# Patient Record
Sex: FEMALE | Race: WHITE | Employment: OTHER | URBAN - METROPOLITAN AREA
[De-identification: names, ages, dates, MRNs, and addresses within clinical notes are randomized per-mention and may not be internally consistent; named-entity substitution may affect disease eponyms.]

---

## 2017-02-24 ENCOUNTER — OFFICE VISIT (OUTPATIENT)
Dept: FAMILY MEDICINE CLINIC | Facility: CLINIC | Age: 67
End: 2017-02-24

## 2017-02-24 ENCOUNTER — LAB ENCOUNTER (OUTPATIENT)
Dept: LAB | Age: 67
End: 2017-02-24
Attending: INTERNAL MEDICINE
Payer: MEDICARE

## 2017-02-24 VITALS
RESPIRATION RATE: 16 BRPM | DIASTOLIC BLOOD PRESSURE: 80 MMHG | SYSTOLIC BLOOD PRESSURE: 124 MMHG | WEIGHT: 228 LBS | TEMPERATURE: 98 F | HEIGHT: 65 IN | BODY MASS INDEX: 37.99 KG/M2 | HEART RATE: 72 BPM

## 2017-02-24 DIAGNOSIS — E03.1 CONGENITAL HYPOTHYROIDISM WITHOUT GOITER: Primary | ICD-10-CM

## 2017-02-24 DIAGNOSIS — R25.1 TREMOR: ICD-10-CM

## 2017-02-24 DIAGNOSIS — E03.1 CONGENITAL HYPOTHYROIDISM WITHOUT GOITER: ICD-10-CM

## 2017-02-24 DIAGNOSIS — R25.1 INVOLUNTARY QUIVER: Primary | ICD-10-CM

## 2017-02-24 LAB
ALBUMIN SERPL-MCNC: 3.7 G/DL (ref 3.5–4.8)
ALP LIVER SERPL-CCNC: 72 U/L (ref 55–142)
ALT SERPL-CCNC: 17 U/L (ref 14–54)
AST SERPL-CCNC: 14 U/L (ref 15–41)
BASOPHILS # BLD AUTO: 0.08 X10(3) UL (ref 0–0.1)
BASOPHILS NFR BLD AUTO: 1.2 %
BILIRUB SERPL-MCNC: 0.4 MG/DL (ref 0.1–2)
BUN BLD-MCNC: 12 MG/DL (ref 8–20)
CALCIUM BLD-MCNC: 8.5 MG/DL (ref 8.3–10.3)
CHLORIDE: 107 MMOL/L (ref 101–111)
CO2: 27 MMOL/L (ref 22–32)
CREAT BLD-MCNC: 0.88 MG/DL (ref 0.55–1.02)
EOSINOPHIL # BLD AUTO: 0.07 X10(3) UL (ref 0–0.3)
EOSINOPHIL NFR BLD AUTO: 1 %
ERYTHROCYTE [DISTWIDTH] IN BLOOD BY AUTOMATED COUNT: 14.4 % (ref 11.5–16)
FREE T4: 1 NG/DL (ref 0.9–1.8)
GLUCOSE BLD-MCNC: 94 MG/DL (ref 70–99)
HCT VFR BLD AUTO: 43.7 % (ref 34–50)
HGB BLD-MCNC: 14.2 G/DL (ref 12–16)
IMMATURE GRANULOCYTE COUNT: 0.02 X10(3) UL (ref 0–1)
IMMATURE GRANULOCYTE RATIO %: 0.3 %
LYMPHOCYTES # BLD AUTO: 1.71 X10(3) UL (ref 0.9–4)
LYMPHOCYTES NFR BLD AUTO: 24.7 %
M PROTEIN MFR SERPL ELPH: 7.6 G/DL (ref 6.1–8.3)
MCH RBC QN AUTO: 28.9 PG (ref 27–33.2)
MCHC RBC AUTO-ENTMCNC: 32.5 G/DL (ref 31–37)
MCV RBC AUTO: 88.8 FL (ref 81–100)
MONOCYTES # BLD AUTO: 0.51 X10(3) UL (ref 0.1–0.6)
MONOCYTES NFR BLD AUTO: 7.4 %
NEUTROPHIL ABS PRELIM: 4.52 X10 (3) UL (ref 1.3–6.7)
NEUTROPHILS # BLD AUTO: 4.52 X10(3) UL (ref 1.3–6.7)
NEUTROPHILS NFR BLD AUTO: 65.4 %
PLATELET # BLD AUTO: 287 10(3)UL (ref 150–450)
POTASSIUM SERPL-SCNC: 4.4 MMOL/L (ref 3.6–5.1)
RBC # BLD AUTO: 4.92 X10(6)UL (ref 3.8–5.1)
RED CELL DISTRIBUTION WIDTH-SD: 46.1 FL (ref 35.1–46.3)
SODIUM SERPL-SCNC: 139 MMOL/L (ref 136–144)
TSI SER-ACNC: 4.22 MIU/ML (ref 0.35–5.5)
WBC # BLD AUTO: 6.9 X10(3) UL (ref 4–13)

## 2017-02-24 PROCEDURE — 84443 ASSAY THYROID STIM HORMONE: CPT

## 2017-02-24 PROCEDURE — 80053 COMPREHEN METABOLIC PANEL: CPT

## 2017-02-24 PROCEDURE — 99213 OFFICE O/P EST LOW 20 MIN: CPT | Performed by: PHYSICIAN ASSISTANT

## 2017-02-24 PROCEDURE — 85025 COMPLETE CBC W/AUTO DIFF WBC: CPT

## 2017-02-24 PROCEDURE — 84439 ASSAY OF FREE THYROXINE: CPT

## 2017-02-24 PROCEDURE — 36415 COLL VENOUS BLD VENIPUNCTURE: CPT

## 2017-02-24 NOTE — PROGRESS NOTES
[facility] Internal Medicine Progress Note    CC:  Patient presents with:  Tremors: worse when writing - wants thyroid labs checked      HPI:   HPI  Tremor  It started a few years ago, and it was rare, she thought it was stress related  She state route. Disp:  Rfl:    MELATONIN CR  MG X1 PM Disp:  Rfl:    OLIVE LEAF OR NASAL SPRAY X2 DAILY Disp:  Rfl:      No current facility-administered medications on file prior to visit.     Review of Systems :  Review of Systems   Constitutional: Negative Differential W Platelet [E]  Comp Metabolic Panel (14) [E]    Meds & Refills for this Visit:  No prescriptions requested or ordered in this encounter    Imaging & Consults:  NEURO - INTERNAL     Patient/Caregiver Education: Patient/Caregiver Education:  The

## 2017-02-24 NOTE — PATIENT INSTRUCTIONS
Thank you for choosing Shakeel Pena PA-C at [place] [address]  To Do: Alesia Juan  1. Pt to get lab work done  2. Referral for neuro, Dr. Jayashree Marvin  3.  Follow-up in 1 month, sooner if problem    • Please signup for MY CHART, which is electronic healthier and to improve your quality of life.     Referrals, and Radiology Information:    If your insurance requires a referral to a specialist, please allow 5 business days to process your referral request.    If Horald Mcardle, PA-C orders a CT or MRI

## 2017-02-27 NOTE — PROGRESS NOTES
Quick Note:    TSH is within range, but more elevated than normal  Will increase levothyroxine to 100mcg daily and recheck lab work in 12 weeks  Pt should still follow-up with neuro as directed, referral was given at [address] [place] [address] Rd  ______

## 2017-02-28 DIAGNOSIS — E03.1 CONGENITAL HYPOTHYROIDISM WITHOUT GOITER: ICD-10-CM

## 2017-02-28 DIAGNOSIS — R79.89 ABNORMAL TSH: Primary | ICD-10-CM

## 2017-02-28 RX ORDER — LEVOTHYROXINE SODIUM 0.1 MG/1
100 TABLET ORAL DAILY
Qty: 90 TABLET | Refills: 1 | Status: SHIPPED | OUTPATIENT
Start: 2017-02-28 | End: 2017-09-06 | Stop reason: DRUGHIGH

## 2017-03-08 ENCOUNTER — OFFICE VISIT (OUTPATIENT)
Dept: NEUROLOGY | Facility: CLINIC | Age: 67
End: 2017-03-08

## 2017-03-08 VITALS
HEART RATE: 68 BPM | SYSTOLIC BLOOD PRESSURE: 100 MMHG | RESPIRATION RATE: 12 BRPM | WEIGHT: 226 LBS | BODY MASS INDEX: 38 KG/M2 | DIASTOLIC BLOOD PRESSURE: 64 MMHG

## 2017-03-08 DIAGNOSIS — R25.1 TREMOR: Primary | ICD-10-CM

## 2017-03-08 PROCEDURE — 99204 OFFICE O/P NEW MOD 45 MIN: CPT | Performed by: OTHER

## 2017-03-08 RX ORDER — CHOLECALCIFEROL (VITAMIN D3) 1MM UNIT/G
4000 LIQUID (GRAM) MISCELLANEOUS DAILY PRN
COMMUNITY

## 2017-03-08 RX ORDER — CHOLECALCIFEROL (VITAMIN D3) 125 MCG
10 CAPSULE ORAL NIGHTLY
COMMUNITY

## 2017-03-08 NOTE — PATIENT INSTRUCTIONS
After your visit at the [facility] office  today,  please direct any follow up questions or medication needs to the staff in our  [place] office so that your concerns may be promptly addressed.   We are available through [employer] or at the numbers below: not schedule the test until this office has notified you that the test has been approved by your insurer. Depending on your insurance carrier, approval may take 3-10 days.  It is highly recommended patients contact their insurance carrier directly to deter

## 2017-03-08 NOTE — H&P
[facility] New Patient / Consult Visit    Roge Zavala is a 78year old female.                          Referring MD: James Soliz    Patient presents with:  Tremors: Rm. 7: R hand tremor when she tries to write or uses the co change, fevers, chills, nausea, double vision/ blurry vision / loss of vision, chest pain, palpitations, shortness of breath, rashes, or bowel / bladder incontinence or mood issues.      Past Medical History   Diagnosis Date   • Sinus disorder    • Lipid sc Diclofenac Sodium 75 MG Oral Tab EC Take 1 tablet (75 mg total) by mouth 2 (two) times daily.  (Patient taking differently: Take 75 mg by mouth nightly.  ) Disp: 60 tablet Rfl: 3   Acetaminophen-Codeine #3 300-30 MG Oral Tab Take 1 tablet by mouth every 6 sharp bilaterally    Cranial Nerves: II through XII  Optic:    Pupils: equally round and reactive to light with direct and consensual responses, normal accomodation   Visual acuity: Normal              Visual fields: Normal  Oculomotor/Trochlear/Abducens: suggest a parkinsonian disorder - exam also demonstrates mild decreased sensation distally.         Overall, suspect this is either essential type tremor or a task specific tremor such as primary writing tremor - recommended trial of  pharmacotherapy - disc

## 2017-03-25 ENCOUNTER — PATIENT MESSAGE (OUTPATIENT)
Dept: FAMILY MEDICINE CLINIC | Facility: CLINIC | Age: 67
End: 2017-03-25

## 2017-03-25 DIAGNOSIS — R79.89 ELEVATED TSH: Primary | ICD-10-CM

## 2017-03-27 RX ORDER — LEVOTHYROXINE SODIUM 88 UG/1
TABLET ORAL
Qty: 90 TABLET | Refills: 1 | Status: SHIPPED | OUTPATIENT
Start: 2017-03-27 | End: 2017-11-02

## 2017-03-27 NOTE — TELEPHONE ENCOUNTER
Patient states she has stopped her levothyroxine due to tremors increasing tremendously after increasing her levothyroxine. States she stopped taking it Sunday 03/26/17.  She is also informing that she is having dental work done and is on a course of Antibi

## 2017-03-27 NOTE — TELEPHONE ENCOUNTER
From: Sumi Mclaughlin  To: Olga Mahmood MD  Sent: 3/25/2017 4:57 PM CDT  Subject: Prescription Question    I have been diagnosed with \"essential tremor\" By Dr Abena Vega. I have a follow up appointment in June.  Meanwhile, upon your recommendation, my

## 2017-03-27 NOTE — TELEPHONE ENCOUNTER
Patient informed of MD recommendations below, via [employer] message.    Order placed for labs   New levothyroxine RX with new dose sent to pharmacy

## 2017-03-27 NOTE — TELEPHONE ENCOUNTER
Lets drop her synthroid dose down to 88 mcg again   And recheck tsh and t4 in 8 weeks   I do think her issues are related to her dental procedure.

## 2017-06-12 ENCOUNTER — TELEPHONE (OUTPATIENT)
Dept: FAMILY MEDICINE CLINIC | Facility: CLINIC | Age: 67
End: 2017-06-12

## 2017-06-12 NOTE — TELEPHONE ENCOUNTER
Requesting labs to check thyroid levels (TSH and T4)  LOV: 2/24/17  RTC: 1 mos  Notes Recorded by Vito Carreon PA-C on 2/26/2017 at 9:04 PM  TSH is within range, but more elevated than normal  Will increase levothyroxine to 100mcg daily and recheck

## 2017-06-23 ENCOUNTER — PATIENT MESSAGE (OUTPATIENT)
Dept: FAMILY MEDICINE CLINIC | Facility: CLINIC | Age: 67
End: 2017-06-23

## 2017-06-23 NOTE — TELEPHONE ENCOUNTER
From: Alesia Juan  To: Calleen Denver, MD  Sent: 6/23/2017 2:26 PM CDT  Subject: Non-Urgent Medical Question    So you are aware, I did not know that a thyroid order had been placed until the week of June 12th. I will take care of this next week.  Your let

## 2017-06-26 ENCOUNTER — TELEPHONE (OUTPATIENT)
Dept: FAMILY MEDICINE CLINIC | Facility: CLINIC | Age: 67
End: 2017-06-26

## 2017-06-26 DIAGNOSIS — E05.90 HYPERTHYROIDISM: Primary | ICD-10-CM

## 2017-06-26 NOTE — TELEPHONE ENCOUNTER
[employer] cannot see lab orders that were entered in February for repeat TSH and Free T4. Will re enter orders since patient is there now for lab draw.

## 2017-06-29 NOTE — TELEPHONE ENCOUNTER
Requesting Alprazolam   LOV: 2/24/17  RTC: 1m   Last Labs: n/a  Filled: 9/1/16 #60 with 0 refills    Future Appointments  Date Time Provider Nicholas Wilks   9/6/2017 11:30 AM Gudelia Basurto MD [facility] [address] [facility] [address]       Per [place]  patient last filled 9/1/1

## 2017-07-05 RX ORDER — ALPRAZOLAM 0.25 MG/1
TABLET ORAL
Qty: 60 TABLET | Refills: 0 | Status: SHIPPED | OUTPATIENT
Start: 2017-07-05 | End: 2018-02-20

## 2017-07-05 NOTE — TELEPHONE ENCOUNTER
Alprazolam approved by MD and faxed to [facility] and confirmed.

## 2017-09-06 ENCOUNTER — OFFICE VISIT (OUTPATIENT)
Dept: FAMILY MEDICINE CLINIC | Facility: CLINIC | Age: 67
End: 2017-09-06

## 2017-09-06 VITALS
SYSTOLIC BLOOD PRESSURE: 116 MMHG | BODY MASS INDEX: 36.82 KG/M2 | HEIGHT: 65 IN | DIASTOLIC BLOOD PRESSURE: 80 MMHG | TEMPERATURE: 98 F | HEART RATE: 82 BPM | WEIGHT: 221 LBS | RESPIRATION RATE: 16 BRPM

## 2017-09-06 DIAGNOSIS — E03.1 CONGENITAL HYPOTHYROIDISM WITHOUT GOITER: ICD-10-CM

## 2017-09-06 DIAGNOSIS — Z00.00 MEDICARE ANNUAL WELLNESS VISIT, SUBSEQUENT: Primary | ICD-10-CM

## 2017-09-06 DIAGNOSIS — E78.00 PURE HYPERCHOLESTEROLEMIA: ICD-10-CM

## 2017-09-06 DIAGNOSIS — E55.9 VITAMIN D DEFICIENCY: ICD-10-CM

## 2017-09-06 DIAGNOSIS — L81.9 HYPERPIGMENTATION: ICD-10-CM

## 2017-09-06 DIAGNOSIS — M79.622 LEFT AXILLARY PAIN: ICD-10-CM

## 2017-09-06 DIAGNOSIS — Z12.39 SCREENING FOR BREAST CANCER: ICD-10-CM

## 2017-09-06 DIAGNOSIS — M25.561 CHRONIC PAIN OF RIGHT KNEE: ICD-10-CM

## 2017-09-06 DIAGNOSIS — Z23 NEED FOR VACCINATION: ICD-10-CM

## 2017-09-06 DIAGNOSIS — G89.29 CHRONIC PAIN OF RIGHT KNEE: ICD-10-CM

## 2017-09-06 PROCEDURE — G0008 ADMIN INFLUENZA VIRUS VAC: HCPCS | Performed by: INTERNAL MEDICINE

## 2017-09-06 PROCEDURE — 99213 OFFICE O/P EST LOW 20 MIN: CPT | Performed by: INTERNAL MEDICINE

## 2017-09-06 PROCEDURE — 90686 IIV4 VACC NO PRSV 0.5 ML IM: CPT | Performed by: INTERNAL MEDICINE

## 2017-09-06 PROCEDURE — G0439 PPPS, SUBSEQ VISIT: HCPCS | Performed by: INTERNAL MEDICINE

## 2017-09-06 NOTE — PROGRESS NOTES
Abdifatah Bell is a 75year old female who presents for a Medicare Annual Wellness visit.     Getting used to shelter   Feels she lost some purpose     Had shooting pain in her left heel after weraing a pair of flats   Thinks she had a plantar fascia f mouth daily as needed. Disp:  Rfl:    Melatonin 5 MG Oral Tab Take 10 mg by mouth nightly. Disp:  Rfl:    NON FORMULARY genexa For leg cramps Disp:  Rfl:    Diclofenac Sodium 75 MG Oral Tab EC Take 1 tablet (75 mg total) by mouth 2 (two) times daily.  (Stephanie Results  Component Value Date   ALT 17 02/24/2017   ALT 16 08/16/2016   ALT 15 01/25/2016       Lab Results  Component Value Date   TSH 3.89 06/26/2017   TSH 4.220 02/24/2017   TSH 2.600 08/16/2016       Lab Results  Component Value Date   BUN 12 02/24/201 on multiple medications?: 1-Yes    Does pain affect your day to day activities?: 1-Yes     Have you had any memory issues?: 0-No    Fall/Risk Scorin    Scoring Interpretation: 4+ At Risk     Depression Screening (PHQ-2/PHQ-9): Over the LAST 2 WEEKS   L flowsheet data found. Glaucoma Screening      Ophthalmology Visit Annually      Bone Density Screening      Dexascan Every two years Last Dexa Scan:   XR DEXA BONE DENSITOMETRY (CPT=77080) 12/11/2015   declined   No flowsheet data found.     Pap and Pelv HgbA1C  Annually HEMOGLOBIN A1c (% of total Hgb)   Date Value   09/08/2014 5.6    No flowsheet data found.     Creat/alb ratio  Annually      LDL  Annually LDL-CHOLESTEROL (mg/dL (calc))   Date Value   08/01/2014 176 (H)     LDL Cholesterol (mg/dL)   Date V Lactobacillus (ACIDOPHILUS PROBIOTIC OR) Take 1 tablet by mouth daily as needed. Disp:  Rfl:    MAGNESIUM ACETATE Take 250 mg by mouth daily as needed.    Disp:  Rfl:    OLIVE LEAF OR NASAL SPRAY EVERY OTHER DAY AS NEEDED Disp:  Rfl:       MEDICAL INFOR denies back pain  NEURO: denies headaches  PSYCHE: denies depression or anxiety  HEMATOLOGIC: denies hx of anemia  ENDOCRINE: denies thyroid history  ALL/ASTHMA: denies hx of allergy or asthma    EXAM:   /80 (BP Location: Right arm, Patient Position: without goiter  -recheck   -continue synthroid   -     ASSAY, THYROID STIM HORMONE  -     FREE T4 (FREE THYROXINE)    Screening for breast cancer  -     [facility] SCREENING BILAT (CPT=77067);  Future    Left axillary pain  -re-evaluate the lymph node  -     US AX

## 2017-09-09 LAB
ABSOLUTE BASOPHILS: 79 CELLS/UL (ref 0–200)
ABSOLUTE EOSINOPHILS: 128 CELLS/UL (ref 15–500)
ABSOLUTE LYMPHOCYTES: 1751 CELLS/UL (ref 850–3900)
ABSOLUTE MONOCYTES: 555 CELLS/UL (ref 200–950)
ABSOLUTE NEUTROPHILS: 3587 CELLS/UL (ref 1500–7800)
ALBUMIN/GLOBULIN RATIO: 1.3 (CALC) (ref 1–2.5)
ALBUMIN: 4.1 G/DL (ref 3.6–5.1)
ALKALINE PHOSPHATASE: 74 U/L (ref 33–130)
ALT: 17 U/L (ref 6–29)
AST: 15 U/L (ref 10–35)
BASOPHILS: 1.3 %
BILIRUBIN, TOTAL: 0.6 MG/DL (ref 0.2–1.2)
BUN: 15 MG/DL (ref 7–25)
CALCIUM: 9.1 MG/DL (ref 8.6–10.4)
CARBON DIOXIDE: 24 MMOL/L (ref 20–31)
CHLORIDE: 104 MMOL/L (ref 98–110)
CHOL/HDLC RATIO: 4.2 (CALC)
CHOLESTEROL, TOTAL: 215 MG/DL
CREATININE: 0.77 MG/DL (ref 0.5–0.99)
EGFR IF AFRICN AM: 92 ML/MIN/1.73M2
EGFR IF NONAFRICN AM: 79 ML/MIN/1.73M2
EOSINOPHILS: 2.1 %
GLOBULIN: 3.1 G/DL (CALC) (ref 1.9–3.7)
GLUCOSE: 91 MG/DL (ref 65–99)
HDL CHOLESTEROL: 51 MG/DL
HEMATOCRIT: 42.5 % (ref 35–45)
HEMOGLOBIN: 14.5 G/DL (ref 11.7–15.5)
LDL-CHOLESTEROL: 148 MG/DL (CALC)
LYMPHOCYTES: 28.7 %
MCH: 29.5 PG (ref 27–33)
MCHC: 34.1 G/DL (ref 32–36)
MCV: 86.4 FL (ref 80–100)
MONOCYTES: 9.1 %
MPV: 10.9 FL (ref 7.5–12.5)
NEUTROPHILS: 58.8 %
NON-HDL CHOLESTEROL: 164 MG/DL (CALC)
PLATELET COUNT: 252 THOUSAND/UL (ref 140–400)
POTASSIUM: 4.6 MMOL/L (ref 3.5–5.3)
PROTEIN, TOTAL: 7.2 G/DL (ref 6.1–8.1)
RDW: 13.8 % (ref 11–15)
RED BLOOD CELL COUNT: 4.92 MILLION/UL (ref 3.8–5.1)
SODIUM: 137 MMOL/L (ref 135–146)
T4, FREE: 1.2 NG/DL (ref 0.8–1.8)
TRIGLYCERIDES: 69 MG/DL
TSH: 3.75 MIU/L (ref 0.4–4.5)
VITAMIN D, 25-OH, TOTAL: 22 NG/ML (ref 30–100)
WHITE BLOOD CELL COUNT: 6.1 THOUSAND/UL (ref 3.8–10.8)

## 2017-09-11 RX ORDER — ERGOCALCIFEROL 1.25 MG/1
50000 CAPSULE ORAL WEEKLY
Qty: 12 CAPSULE | Refills: 0 | Status: SHIPPED | OUTPATIENT
Start: 2017-09-11 | End: 2017-11-28

## 2017-11-02 RX ORDER — LEVOTHYROXINE SODIUM 88 UG/1
TABLET ORAL
Qty: 90 TABLET | Refills: 1 | Status: SHIPPED
Start: 2017-11-02 | End: 2018-10-06

## 2017-11-02 NOTE — TELEPHONE ENCOUNTER
Patient phoned and said that specialist is very far from her and wanted to know if Dr. Radha Young would refill? Diclofenac Sodium 75 mg oral tab EC 1 tab daily per patient.

## 2017-11-02 NOTE — TELEPHONE ENCOUNTER
Thyroid Supplements Protocol Passed  Requesting Levothyroxine 88mcg and diclofenac sodium  LOV: 9/6/17  RTC: none specified  Last Labs: 9/8/17  Filled: Levothyroxine 3/27/17 #90 with 1 refills-approved  Diclofenac Sodium 75 MG #60 with 3 refills- non abner

## 2017-11-02 NOTE — TELEPHONE ENCOUNTER
From: Rudy Adams  Sent: 11/2/2017 11:33 AM CDT  Subject: Medication Renewal Request    Rudy Adams would like a refill of the following medications:     Levothyroxine Sodium 88 MCG Oral Tab Pushpa Schuler MD]    Preferred pharmacy: [facility]

## 2017-11-03 RX ORDER — DICLOFENAC SODIUM 75 MG/1
75 TABLET, DELAYED RELEASE ORAL 2 TIMES DAILY
Qty: 60 TABLET | Refills: 3 | Status: SHIPPED | OUTPATIENT
Start: 2017-11-03 | End: 2017-12-28

## 2017-11-17 ENCOUNTER — APPOINTMENT (OUTPATIENT)
Dept: GENERAL RADIOLOGY | Facility: HOSPITAL | Age: 67
End: 2017-11-17

## 2017-11-17 ENCOUNTER — HOSPITAL ENCOUNTER (EMERGENCY)
Facility: HOSPITAL | Age: 67
Discharge: HOME | End: 2017-11-17
Admitting: EMERGENCY MEDICINE

## 2017-11-17 VITALS
TEMPERATURE: 97.7 F | RESPIRATION RATE: 20 BRPM | HEART RATE: 66 BPM | OXYGEN SATURATION: 98 % | BODY MASS INDEX: 36.65 KG/M2 | WEIGHT: 220 LBS | SYSTOLIC BLOOD PRESSURE: 114 MMHG | DIASTOLIC BLOOD PRESSURE: 70 MMHG | HEIGHT: 65 IN

## 2017-11-17 DIAGNOSIS — S69.91XA INJURY OF RIGHT SCAPHOLUNATE LIGAMENT WITH NO INSTABILITY, INITIAL ENCOUNTER: ICD-10-CM

## 2017-11-17 DIAGNOSIS — M79.641 PAIN OF RIGHT HAND: Primary | ICD-10-CM

## 2017-11-17 PROCEDURE — 73110 X-RAY EXAM OF WRIST: CPT

## 2017-11-17 PROCEDURE — 73130 X-RAY EXAM OF HAND: CPT

## 2017-11-17 PROCEDURE — 99283 EMERGENCY DEPT VISIT LOW MDM: CPT

## 2017-11-17 RX ORDER — DICLOFENAC SODIUM AND MISOPROSTOL 50; 200 MG/1; UG/1
1 TABLET, DELAYED RELEASE ORAL 2 TIMES DAILY
COMMUNITY

## 2017-11-17 RX ORDER — LEVOTHYROXINE SODIUM 88 UG/1
88 TABLET ORAL DAILY
COMMUNITY

## 2017-11-17 RX ORDER — HYDROCODONE BITARTRATE AND ACETAMINOPHEN 5; 325 MG/1; MG/1
1 TABLET ORAL EVERY 6 HOURS PRN
Qty: 8 TABLET | Refills: 0 | Status: SHIPPED | OUTPATIENT
Start: 2017-11-17 | End: 2017-11-19

## 2017-11-17 NOTE — ED PROVIDER NOTES
Subjective   HPI Comments: Patient is a 67-year-old  female COMPLAINING OF rt hand and wrist PAIN.  PATIENT REPORTS THAT SHE was traveling through [place] when she decided to stop at the [place]. Pt STATES SHE WAS WALKING out when she tripped ON HER SHOES.  PATIENT STATES SHE FELL ON HER OUTSTRETCHED RIGHT HAND. She is right hand dominant.  PATIENT REPORTS THAT SHE DECIDED COME TO THE ER FOR FURTHER EVALUATION.  She denies hitting her head she denies any other injuries.    Patient is a 67 y.o. female presenting with upper extremity pain.   History provided by:  Patient   used: No    Upper Extremity Issue   Location:  Hand and wrist  Wrist location:  R wrist  Hand location:  R hand  Injury: yes    Mechanism of injury: fall    Fall:     Fall occurred:  Standing    Impact surface:  [place]    Point of impact:  Outstretched arms    Entrapped after fall: no    Pain details:     Quality:  Aching and dull    Radiates to:  Does not radiate    Severity:  Mild    Onset quality:  Sudden    Duration:  1 hour    Timing:  Constant    Progression:  Unchanged  Handedness:  Right-handed  Dislocation: no    Foreign body present:  No foreign bodies  Prior injury to area:  No  Relieved by:  Nothing  Worsened by:  Nothing  Ineffective treatments:  None tried  Associated symptoms: no back pain, no decreased range of motion, no fatigue, no fever, no muscle weakness, no neck pain, no numbness, no stiffness, no swelling and no tingling    Risk factors: no concern for non-accidental trauma, no known bone disorder, no frequent fractures and no recent illness        Review of Systems   Constitutional: Negative for fatigue and fever.   Musculoskeletal: Negative for back pain, neck pain and stiffness.   All other systems reviewed and are negative.      Past Medical History:   Diagnosis Date   • Cancer     breast   • Disease of thyroid gland        No Known Allergies    Past Surgical History:   Procedure  Laterality Date   • KNEE SURGERY Right        History reviewed. No pertinent family history.    Social History     Social History   • Marital status: Single     Spouse name: N/A   • Number of children: N/A   • Years of education: N/A     Social History Main Topics   • Smoking status: Never Smoker   • Smokeless tobacco: Never Used   • Alcohol use No   • Drug use: No   • Sexual activity: Defer     Other Topics Concern   • None     Social History Narrative   • None           Objective   Physical Exam   Constitutional: She is oriented to person, place, and time. She appears well-developed and well-nourished.   HENT:   Head: Normocephalic and atraumatic.   Cardiovascular: Normal rate.    Musculoskeletal: Normal range of motion.        Arms:  Neurological: She is alert and oriented to person, place, and time.   Skin: Skin is warm and dry.   Psychiatric: She has a normal mood and affect.   Nursing note and vitals reviewed.      Procedures         ED Course  ED Course   Comment By Time   X-ray of the right hand shows no acute findings.  X-ray of the wrist showed no acute fracture.  There is widening of the scapholunate interval which does raise suspicion for scapholunate dissociation or a ligament injury. Susie Chen, APRN 11/17 9051   Patient will be discharged home in stable condition. I will place her in a FA volar orthoglass splint. I did offer the pt some pain medication but she declines at this time and states that she will just use some Tylenol. She is advised to follow-up with her orthopedic physician when she arrives in [place].  Patient is advised that she may r/t to the ER if any new or worsening symptoms. The pt will be DC home at this time in stable cond. Susie Chen, APRN 11/17 2212   Pt case discussed with Dr. Bermudez who agrees with plan of care. Susie Chen, APRN 11/17 1440        XR Wrist 3+ View Right   Final Result   1. No acute fracture is identified.   2. There is widening of the  scapholunate interval (normal less than 2 mm   in adults) which raises suspicion for scapholunate dissociation/   scapholunate ligament injury.           This report was finalized on 11/17/2017 13:49 by Dr Zach Cutler, .      XR Hand 3+ View Right   Final Result   1. No acute osseous injury or malalignment.           This report was finalized on 11/17/2017 13:47 by Dr Zach Cutler, .                  MDM  Number of Diagnoses or Management Options  Pain of right hand: new and requires workup  Sprain of right wrist, initial encounter: new and requires workup     Amount and/or Complexity of Data Reviewed  Tests in the radiology section of CPT®: ordered and reviewed    Patient Progress  Patient progress: stable      Final diagnoses:   Pain of right hand   Injury of right scapholunate ligament with no instability, initial encounter            Susie Chen, APRN  11/17/17 1439       Susie Chen, APRN  11/17/17 1446       Susie Chen, APRN  11/17/17 1452

## 2017-11-17 NOTE — ED NOTES
C/o's 'right hand pain, I tripped on my shoe about an hour ago, my right hand was the point of impact.'     Marjorie Barba RN  11/17/17 4176

## 2018-01-16 ENCOUNTER — HOSPITAL ENCOUNTER (OUTPATIENT)
Dept: MAMMOGRAPHY | Age: 68
Discharge: HOME | End: 2018-01-16
Attending: INTERNAL MEDICINE
Payer: MEDICARE

## 2018-01-16 DIAGNOSIS — Z12.39 SCREENING FOR BREAST CANCER: ICD-10-CM

## 2018-01-16 PROCEDURE — 77067 SCR MAMMO BI INCL CAD: CPT | Performed by: INTERNAL MEDICINE

## 2018-01-17 ENCOUNTER — HOSPITAL ENCOUNTER (OUTPATIENT)
Dept: GENERAL RADIOLOGY | Age: 68
Discharge: HOME | End: 2018-01-17
Attending: PHYSICIAN ASSISTANT
Payer: MEDICARE

## 2018-01-17 ENCOUNTER — OFFICE VISIT (OUTPATIENT)
Dept: FAMILY MEDICINE CLINIC | Facility: CLINIC | Age: 68
End: 2018-01-17

## 2018-01-17 ENCOUNTER — TELEPHONE (OUTPATIENT)
Dept: INTERNAL MEDICINE CLINIC | Facility: CLINIC | Age: 68
End: 2018-01-17

## 2018-01-17 VITALS
HEIGHT: 65 IN | SYSTOLIC BLOOD PRESSURE: 124 MMHG | TEMPERATURE: 98 F | WEIGHT: 222 LBS | OXYGEN SATURATION: 98 % | DIASTOLIC BLOOD PRESSURE: 78 MMHG | BODY MASS INDEX: 36.99 KG/M2 | RESPIRATION RATE: 16 BRPM | HEART RATE: 80 BPM

## 2018-01-17 DIAGNOSIS — R05.9 COUGH: Primary | ICD-10-CM

## 2018-01-17 DIAGNOSIS — G47.9 SLEEPING DIFFICULTY: ICD-10-CM

## 2018-01-17 DIAGNOSIS — R05.9 COUGH: ICD-10-CM

## 2018-01-17 DIAGNOSIS — E66.9 OBESITY (BMI 30-39.9): ICD-10-CM

## 2018-01-17 PROCEDURE — 99213 OFFICE O/P EST LOW 20 MIN: CPT | Performed by: PHYSICIAN ASSISTANT

## 2018-01-17 PROCEDURE — 71046 X-RAY EXAM CHEST 2 VIEWS: CPT | Performed by: PHYSICIAN ASSISTANT

## 2018-01-17 RX ORDER — DOXYCYCLINE HYCLATE 100 MG
100 TABLET ORAL 2 TIMES DAILY
Qty: 20 TABLET | Refills: 0 | Status: SHIPPED | OUTPATIENT
Start: 2018-01-17 | End: 2018-01-29 | Stop reason: ALTCHOICE

## 2018-01-17 RX ORDER — ALBUTEROL SULFATE 90 UG/1
2 AEROSOL, METERED RESPIRATORY (INHALATION) EVERY 4 HOURS PRN
Qty: 1 INHALER | Refills: 6 | Status: SHIPPED | OUTPATIENT
Start: 2018-01-17 | End: 2021-06-03

## 2018-01-17 RX ORDER — BENZONATATE 200 MG/1
200 CAPSULE ORAL 3 TIMES DAILY PRN
Qty: 30 CAPSULE | Refills: 0 | Status: SHIPPED | OUTPATIENT
Start: 2018-01-17 | End: 2018-03-20 | Stop reason: ALTCHOICE

## 2018-01-17 RX ORDER — METHYLPREDNISOLONE 4 MG/1
TABLET ORAL
Qty: 1 KIT | Refills: 0 | Status: SHIPPED | OUTPATIENT
Start: 2018-01-17 | End: 2018-01-29 | Stop reason: ALTCHOICE

## 2018-01-17 NOTE — TELEPHONE ENCOUNTER
Patient was seen in our office today & order was written for [facility]. Order was faxed to [phone] as listed on form & copy was given to patient, she was instructed to call the office to schedule her appointment.  Fax confirmation

## 2018-01-17 NOTE — PROGRESS NOTES
[facility] Internal Medicine Progress Note    CC:  Patient presents with:  Cough: chest congestion.  Taking  OTC Robitussin      HPI:   HPI  Cough  Cough since [place]  Pt knows her smoking doesn't help  She tried chantix, but she was getting ho differently: DISSOLVE ONE LOZENGE  BY MOUTH FIVE TIMES DAILY AS NEEDED ) Disp: 70 lozenge Rfl: 2   B Complex Vitamins (B COMPLEX OR) as needed. Disp:  Rfl:    POTASSIUM GLUCONATE OR Take 550 mg by mouth as needed.  Takes when has cramps in feet Disp:  Rfl Neurological: She is alert and oriented to person, place, and time. Skin: Skin is warm and dry. Psychiatric: Mood and affect normal.   Vitals reviewed.         Assessment and Plan:  Cough  (primary encounter diagnosis)  Pt to get chest xray  Start med meniscus of knee, current     Heart disease, unspecified     Tobacco use disorder     Cardiac dysrhythmia, unspecified     Myalgia and myositis, unspecified     Hypothyroidism     Osteoarthrosis, unspecified whether generalized or localized, involving lowe

## 2018-01-17 NOTE — PATIENT INSTRUCTIONS
Thank you for choosing Balbina Carpenter PA-C at [place] [address]  To Do: Werner Potter  1. Pt to begin medications as prescribed  2. Get chest xray  3. Referral for [facility]  4. Follow-up 3 months, sooner if problems  5.  Information for sleep medicine c risks of treatment even beyond those discussed today.  All therapies have potential risk of harm or side effects or medication interactions.  It is your duty and for your safety to discuss with the pharmacist and our office with questions, and to notify us

## 2018-01-29 PROCEDURE — 86235 NUCLEAR ANTIGEN ANTIBODY: CPT | Performed by: INTERNAL MEDICINE

## 2018-02-20 RX ORDER — ALPRAZOLAM 0.25 MG/1
TABLET ORAL
Qty: 60 TABLET | Refills: 0 | Status: SHIPPED
Start: 2018-02-20 | End: 2018-11-12

## 2018-02-20 NOTE — TELEPHONE ENCOUNTER
Requesting Alprazolam   LOV: 1/17/18  RTC: 3 months   Last Relevant Labs: n/a   Filled: 7/5/17 #60 with 0 refills    Future Appointments  Date Time Provider Nicholas Wilks   3/9/2018 3:00 PM CAROLYNN NETTLES [facility]   3/20/2018 2:20 PM Manan Mckeon

## 2018-03-20 PROBLEM — M15.9 PRIMARY OSTEOARTHRITIS INVOLVING MULTIPLE JOINTS: Status: ACTIVE | Noted: 2018-03-20

## 2018-03-30 ENCOUNTER — MED REC SCAN ONLY (OUTPATIENT)
Dept: FAMILY MEDICINE CLINIC | Facility: CLINIC | Age: 68
End: 2018-03-30

## 2018-05-08 PROCEDURE — 88305 TISSUE EXAM BY PATHOLOGIST: CPT | Performed by: INTERNAL MEDICINE

## 2018-05-25 ENCOUNTER — PATIENT OUTREACH (OUTPATIENT)
Dept: FAMILY MEDICINE CLINIC | Facility: CLINIC | Age: 68
End: 2018-05-25

## 2018-07-18 ENCOUNTER — LAB ENCOUNTER (OUTPATIENT)
Dept: LAB | Age: 68
End: 2018-07-18
Attending: PHYSICIAN ASSISTANT
Payer: MEDICARE

## 2018-07-18 ENCOUNTER — OFFICE VISIT (OUTPATIENT)
Dept: FAMILY MEDICINE CLINIC | Facility: CLINIC | Age: 68
End: 2018-07-18
Payer: MEDICARE

## 2018-07-18 VITALS
WEIGHT: 224 LBS | BODY MASS INDEX: 38.24 KG/M2 | DIASTOLIC BLOOD PRESSURE: 80 MMHG | SYSTOLIC BLOOD PRESSURE: 124 MMHG | HEART RATE: 86 BPM | RESPIRATION RATE: 16 BRPM | HEIGHT: 64 IN | TEMPERATURE: 98 F

## 2018-07-18 DIAGNOSIS — Z01.818 PREOP EXAMINATION: Primary | ICD-10-CM

## 2018-07-18 DIAGNOSIS — G47.30 SLEEP APNEA, UNSPECIFIED TYPE: ICD-10-CM

## 2018-07-18 DIAGNOSIS — E03.1 CONGENITAL HYPOTHYROIDISM WITHOUT GOITER: ICD-10-CM

## 2018-07-18 DIAGNOSIS — I82.4Y9 DEEP VEIN THROMBOSIS (DVT) OF PROXIMAL LOWER EXTREMITY, UNSPECIFIED CHRONICITY, UNSPECIFIED LATERALITY (HCC): ICD-10-CM

## 2018-07-18 DIAGNOSIS — Z85.3 HISTORY OF BREAST CANCER: ICD-10-CM

## 2018-07-18 LAB
ALBUMIN SERPL-MCNC: 3.4 G/DL (ref 3.5–4.8)
ALP LIVER SERPL-CCNC: 67 U/L (ref 55–142)
ALT SERPL-CCNC: 15 U/L (ref 14–54)
AST SERPL-CCNC: 15 U/L (ref 15–41)
BASOPHILS # BLD AUTO: 0.07 X10(3) UL (ref 0–0.1)
BASOPHILS NFR BLD AUTO: 1.2 %
BILIRUB SERPL-MCNC: 0.4 MG/DL (ref 0.1–2)
BUN BLD-MCNC: 11 MG/DL (ref 8–20)
CALCIUM BLD-MCNC: 8.7 MG/DL (ref 8.3–10.3)
CHLORIDE: 110 MMOL/L (ref 101–111)
CO2: 25 MMOL/L (ref 22–32)
CREAT BLD-MCNC: 0.86 MG/DL (ref 0.55–1.02)
EOSINOPHIL # BLD AUTO: 0.14 X10(3) UL (ref 0–0.3)
EOSINOPHIL NFR BLD AUTO: 2.4 %
ERYTHROCYTE [DISTWIDTH] IN BLOOD BY AUTOMATED COUNT: 14.6 % (ref 11.5–16)
GLUCOSE BLD-MCNC: 127 MG/DL (ref 70–99)
HCT VFR BLD AUTO: 42.6 % (ref 34–50)
HGB BLD-MCNC: 13.7 G/DL (ref 12–16)
IMMATURE GRANULOCYTE COUNT: 0.01 X10(3) UL (ref 0–1)
IMMATURE GRANULOCYTE RATIO %: 0.2 %
LYMPHOCYTES # BLD AUTO: 1.72 X10(3) UL (ref 0.9–4)
LYMPHOCYTES NFR BLD AUTO: 29.2 %
M PROTEIN MFR SERPL ELPH: 7.2 G/DL (ref 6.1–8.3)
MCH RBC QN AUTO: 28.7 PG (ref 27–33.2)
MCHC RBC AUTO-ENTMCNC: 32.2 G/DL (ref 31–37)
MCV RBC AUTO: 89.1 FL (ref 81–100)
MONOCYTES # BLD AUTO: 0.48 X10(3) UL (ref 0.1–1)
MONOCYTES NFR BLD AUTO: 8.1 %
NEUTROPHIL ABS PRELIM: 3.48 X10 (3) UL (ref 1.3–6.7)
NEUTROPHILS # BLD AUTO: 3.48 X10(3) UL (ref 1.3–6.7)
NEUTROPHILS NFR BLD AUTO: 58.9 %
PLATELET # BLD AUTO: 246 10(3)UL (ref 150–450)
POTASSIUM SERPL-SCNC: 3.8 MMOL/L (ref 3.6–5.1)
RBC # BLD AUTO: 4.78 X10(6)UL (ref 3.8–5.1)
RED CELL DISTRIBUTION WIDTH-SD: 48 FL (ref 35.1–46.3)
SODIUM SERPL-SCNC: 143 MMOL/L (ref 136–144)
WBC # BLD AUTO: 5.9 X10(3) UL (ref 4–13)

## 2018-07-18 PROCEDURE — 85025 COMPLETE CBC W/AUTO DIFF WBC: CPT

## 2018-07-18 PROCEDURE — 99214 OFFICE O/P EST MOD 30 MIN: CPT | Performed by: PHYSICIAN ASSISTANT

## 2018-07-18 PROCEDURE — 80053 COMPREHEN METABOLIC PANEL: CPT

## 2018-07-18 PROCEDURE — 36415 COLL VENOUS BLD VENIPUNCTURE: CPT

## 2018-07-18 NOTE — PATIENT INSTRUCTIONS
Thank you for choosing Ti Mascorro PA-C at [place] [address]  To Do: Oly Bansal  1. Pt to get labs done  2.  Follow-up after surgery as directed  Considerations in the Preoperative period:   Surgery is a big deal.  Anesthesia and your medicin with your doctor or cardiologist    Psychotropic agents:  Serotinin reuptake inhibitors like zoloft, effexor, paxil, prozac, citalopram, remeron etc. For mood should be held 3 weeks before surgery if high risk of bleeding as these may increase risk of blee 830am-12p. call [phone] but walk-in is fine.    •To schedule Imaging or tests at [facility] Schedule [phone], Go to [facility] A ER Building (For example CT scans, X rays, Ultrasound, MRI)  •Cardiac Testing in ER building Building A second wilfrid up appointment. We cannot refill your maintenance medications at a preventative wellness visit. To best provide you care, patients receiving maintenance medications need to be seen at least twice a year.

## 2018-07-18 NOTE — PROGRESS NOTES
Preoperative History and Physical    CC:  Patient presents with:  Pre-Op Exam: L extracapsular cataracct removal on 7/26/18 with Dr. Kam Kurtz is 70year old presenting for a preoperative exam.    This patient is having surgery on елена POSSIBLE POLYPECTOMY 16077;  Surgeon: aIn Harrington MD;  Location: [facility]  08/19/2015: KNEE REPLACEMENT SURGERY Right  1/2012: LUMPECTOMY LEFT Left      Comment: Invasive Ductal CA  1/2012: NEEDLE BIOPSY LEFT Left      Comment: [address] Liquid Take 4,000 Int'l Units by mouth daily as needed. Disp:  Rfl:    Melatonin 5 MG Oral Tab Take 10 mg by mouth nightly. Disp:  Rfl:    NON FORMULARY genexa For leg cramps Disp:  Rfl:    B Complex Vitamins (B COMPLEX OR) as needed.    Disp:  Rfl:    BONNY no tenderness. Lymphadenopathy:     She has no cervical adenopathy. Neurological: She is alert and oriented to person, place, and time. Skin: Skin is warm and dry. Psychiatric: Mood and affect normal.   Vitals reviewed.         Assessment and Plan: Education: There are no barriers to learning. Medical education done. Outcome: Patient verbalizes understanding.       Orders Placed This Encounter      Comp Metabolic Panel (14) [E]      CBC W Differential W Platelet [E]  No orders of the defined types wer

## 2018-09-05 ENCOUNTER — TELEPHONE (OUTPATIENT)
Dept: FAMILY MEDICINE CLINIC | Facility: CLINIC | Age: 68
End: 2018-09-05

## 2018-09-05 DIAGNOSIS — E78.00 PURE HYPERCHOLESTEROLEMIA: Primary | ICD-10-CM

## 2018-09-05 DIAGNOSIS — E55.9 VITAMIN D DEFICIENCY: ICD-10-CM

## 2018-09-05 DIAGNOSIS — E03.1 CONGENITAL HYPOTHYROIDISM WITHOUT GOITER: ICD-10-CM

## 2018-09-05 NOTE — TELEPHONE ENCOUNTER
Patient has been notified via [employer] reminder her to have her labs done 1-2 days prior to appt.     Labs pended for review/approval.  CBC, CMP done 7/18/18  Future Appointments  Date Time Provider Nicholas Wilks   9/11/2018 11:00 AM Rufina Kumari DO

## 2018-09-08 LAB
CHOL/HDLC RATIO: 4.4 (CALC)
CHOLESTEROL, TOTAL: 216 MG/DL
HDL CHOLESTEROL: 49 MG/DL
LDL-CHOLESTEROL: 149 MG/DL (CALC)
NON-HDL CHOLESTEROL: 167 MG/DL (CALC)
TRIGLYCERIDES: 76 MG/DL
TSH W/REFLEX TO FT4: 2.79 MIU/L (ref 0.4–4.5)
VITAMIN D, 25-OH, TOTAL: 30 NG/ML (ref 30–100)

## 2018-09-11 ENCOUNTER — OFFICE VISIT (OUTPATIENT)
Dept: FAMILY MEDICINE CLINIC | Facility: CLINIC | Age: 68
End: 2018-09-11
Payer: MEDICARE

## 2018-09-11 VITALS
SYSTOLIC BLOOD PRESSURE: 126 MMHG | WEIGHT: 223.13 LBS | BODY MASS INDEX: 38.09 KG/M2 | TEMPERATURE: 98 F | DIASTOLIC BLOOD PRESSURE: 80 MMHG | HEIGHT: 64 IN | RESPIRATION RATE: 16 BRPM | HEART RATE: 74 BPM

## 2018-09-11 DIAGNOSIS — Z00.00 ENCOUNTER FOR ANNUAL HEALTH EXAMINATION: Primary | ICD-10-CM

## 2018-09-11 DIAGNOSIS — Z28.21 INFLUENZA VACCINATION DECLINED BY PATIENT: ICD-10-CM

## 2018-09-11 DIAGNOSIS — R73.01 IMPAIRED FASTING GLUCOSE: ICD-10-CM

## 2018-09-11 DIAGNOSIS — Z13.31 NEGATIVE DEPRESSION SCREENING: ICD-10-CM

## 2018-09-11 PROBLEM — R73.03 PREDIABETES: Status: ACTIVE | Noted: 2018-09-11

## 2018-09-11 LAB
CARTRIDGE LOT#: 843 NUMERIC
HEMOGLOBIN A1C: 6.1 % (ref 4.3–5.6)

## 2018-09-11 PROCEDURE — G0439 PPPS, SUBSEQ VISIT: HCPCS | Performed by: FAMILY MEDICINE

## 2018-09-11 PROCEDURE — 83036 HEMOGLOBIN GLYCOSYLATED A1C: CPT | Performed by: FAMILY MEDICINE

## 2018-09-11 RX ORDER — PREDNISOLONE ACETATE 10 MG/ML
1 SUSPENSION/ DROPS OPHTHALMIC 2 TIMES DAILY
Refills: 1 | COMMUNITY
Start: 2018-08-16 | End: 2020-08-19

## 2018-09-11 RX ORDER — KETOROLAC TROMETHAMINE 5 MG/ML
1 SOLUTION OPHTHALMIC 2 TIMES DAILY
Refills: 1 | COMMUNITY
Start: 2018-08-16 | End: 2019-04-24

## 2018-09-11 NOTE — PROGRESS NOTES
HPI:   Alesia Juan is a 70year old female who presents for a Medicare Subsequent Annual Wellness visit (Pt already had Initial Annual Wellness).     Her last annual assessment has been over 1 year: Annual Physical due on 09/06/2018       HLD current Smokeless tobacco: Never Used     This is a tobacco user, and I will give tobacco cessation counseling today.  (update Vitals or Tob Hx section to lupe Tobacco Cessation counseling given as YES and refresh this link)          CAGE Alcohol screening   Era Poag hcl]; and lactose. CURRENT MEDICATIONS:     Outpatient Medications Marked as Taking for the 9/11/18 encounter (Office Visit) with Tj Tipton DO:  ketorolac tromethamine 0.5 % Ophthalmic Solution Place 1 drop into both eyes 2 (two) times daily. 1/2012); radiation left (2012); lumpectomy left (Left, 1/2012); knee replacement surgery (Right, 08/19/2015); cataract (Bilateral); COLONOSCOPY, POSSIBLE BIOPSY, POSSIBLE POLYPECTOMY 73699 (N/A, 5/8/2018); and KNEE TOTAL REPLACEMENT (Right, 8/19/2015). normal, no drainage or sinus tenderness   Throat: Lips, mucosa, and tongue normal; teeth and gums normal   Neck: Supple, symmetrical, trachea midline, no adenopathy;  thyroid: not enlarged, symmetric, no tenderness/mass/nodules; no carotid bruit or JVD   B screening    Influenza vaccination declined by patient  - pt wants to return for shot     Impaired fasting glucose  - elevated glucose on recent labs, a1c added on   -     HEMOGLOBIN A1C         Diet assessment: good     PLAN:  The patient indicates unders Screening      Ophthalmology Visit Annually: Diabetics, FHx Glaucoma, AA>50, > 65 No flowsheet data found.     Bone Density Screening      Dexascan Every two years Last Dexa Scan:   XR DEXA BONE DENSITOMETRY (CPT=77080) 12/11/2015    No flowsheet da

## 2018-09-11 NOTE — PATIENT INSTRUCTIONS
Continue vitamin D 2,000-4,000 iu daily    Handouts given for Heart Scan and CT Scan Chest    Shingles vaccine available at pharmacy, please have them send a record if you receive any vaccines.              Katerina Carnett's SCREENING SCHEDULE   Tests on t found for this or any previous visit.  Limited to patients who meet one of the following criteria:   • Men who are 73-68 years old and have smoked more than 100 cigarettes in their lifetime   • Anyone with a family history    Colorectal Cancer Screening  Co Mammogram regularly   Immunizations      Influenza  Covered Annually Orders placed or performed in visit on 10/13/16   • FLU VAC NO PRSV 4 LISA 3 YRS+   Orders placed or performed in visit on 09/29/14   • FLU VAC NO PRSV 4 LISA 3 YRS+   Orders placed or perf available on it's website for anyone to review and print using their home computer and printer. (the forms are also available in [address])  www. [employer]. org  This link also has information from the [address] regarding Advance [employer] illnesses; and higher health, life, and car insurance premiums. Cigars and pipes count too! Cigars and pipes are also dangerous. So are smokeless (chewing) tobacco and snuff.  All of these products contain nicotine, a highly addictive substance that has h ways. It can:  · Raise your good cholesterol  · Help lower your bad cholesterol  · Let blood flow better through your body  · Give more oxygen to your muscles and tissues  · Help you manage your weight  · Help your heart pump better  · Lower your blood pre stress can greatly lower your risk of getting cardiovascular disease. Making the most of medicines  Healthy eating and exercise are a good start to keeping your cholesterol down. But you may need some extra help from medicine.  If your doctor prescribes me high-risk group, talk with your healthcare provider about your treatment goals. Make sure you understand why these goals are important, based on your own health history and your family history of heart disease or high cholesterol.   Make a plan to have regu

## 2018-09-19 ENCOUNTER — PATIENT MESSAGE (OUTPATIENT)
Dept: FAMILY MEDICINE CLINIC | Facility: CLINIC | Age: 68
End: 2018-09-19

## 2018-09-19 DIAGNOSIS — E66.9 OBESITY (BMI 30-39.9): Primary | ICD-10-CM

## 2018-09-19 NOTE — TELEPHONE ENCOUNTER
Requesting Weight Loss referral  LOV: 9/11/18  RTC: 6 months    Future Appointments   Date Time Provider Nicholas Ramosi   2/19/2019 12:00 PM Hilary Norton MD [facility]     I have pended the order for [facility] if okay please sign. Thank you!

## 2018-09-19 NOTE — TELEPHONE ENCOUNTER
From: Alberto Melchor  To: Guevara Santos DO  Sent: 9/19/2018 11:30 AM CDT  Subject: Non-Urgent Medical Question    Can I get a referral for Dr Sagrario May at [facility]? it is time for me to do something about my weight! Thank you!   K

## 2018-10-09 RX ORDER — LEVOTHYROXINE SODIUM 88 UG/1
TABLET ORAL
Qty: 90 TABLET | Refills: 1 | Status: SHIPPED | OUTPATIENT
Start: 2018-10-09 | End: 2019-04-15

## 2018-10-09 NOTE — TELEPHONE ENCOUNTER
Requesting Levothyroxine  LOV: 9/11/18  RTC: 6 months  Last Relevant Labs: 9/7/18  Filled: 11/2/17 #90 with 1 refills    Future Appointments   Date Time Provider Nicholas Wilks   2/19/2019 12:00 PM Ford Sofia MD [facility]     PP - refilled

## 2018-10-22 ENCOUNTER — IMMUNIZATION (OUTPATIENT)
Dept: FAMILY MEDICINE CLINIC | Facility: CLINIC | Age: 68
End: 2018-10-22
Payer: MEDICARE

## 2018-10-22 PROCEDURE — G0008 ADMIN INFLUENZA VIRUS VAC: HCPCS | Performed by: FAMILY MEDICINE

## 2018-10-22 PROCEDURE — 90653 IIV ADJUVANT VACCINE IM: CPT | Performed by: FAMILY MEDICINE

## 2018-11-12 RX ORDER — ALPRAZOLAM 0.25 MG/1
0.25 TABLET ORAL NIGHTLY PRN
Qty: 30 TABLET | Refills: 0 | Status: SHIPPED
Start: 2018-11-12 | End: 2019-05-24

## 2018-11-12 NOTE — TELEPHONE ENCOUNTER
Requesting Alprazolam 0.25mg  LOV: 9/11/18  RTC: 6 mos  Last Labs: n/a  Filled: 2/20/18 #60  with 0 refills    Future Appointments   Date Time Provider Nicholas Wilks   2/19/2019 12:00 PM [facility]

## 2019-01-16 ENCOUNTER — OFFICE VISIT (OUTPATIENT)
Dept: FAMILY MEDICINE CLINIC | Facility: CLINIC | Age: 69
End: 2019-01-16
Payer: MEDICARE

## 2019-01-16 VITALS
DIASTOLIC BLOOD PRESSURE: 70 MMHG | RESPIRATION RATE: 16 BRPM | BODY MASS INDEX: 39.27 KG/M2 | HEIGHT: 64 IN | HEART RATE: 94 BPM | OXYGEN SATURATION: 98 % | WEIGHT: 230 LBS | SYSTOLIC BLOOD PRESSURE: 118 MMHG | TEMPERATURE: 98 F

## 2019-01-16 DIAGNOSIS — J06.9 VIRAL URI WITH COUGH: Primary | ICD-10-CM

## 2019-01-16 PROCEDURE — 99213 OFFICE O/P EST LOW 20 MIN: CPT | Performed by: NURSE PRACTITIONER

## 2019-01-16 NOTE — PROGRESS NOTES
CHIEF COMPLAINT:   Patient presents with:  Nasal Congestion: since 12/30, tightness in chest, cough      HPI:   Rudy Adams is a 70year old female who presents for upper respiratory symptoms for  2 weeks. Patient reports congestion, dry cough.  Sympto Lactobacillus (ACIDOPHILUS PROBIOTIC OR) Take 1 tablet by mouth daily as needed. Disp:  Rfl:    MAGNESIUM ACETATE Take 250 mg by mouth daily as needed.    Disp:  Rfl:    OLIVE LEAF OR NASAL SPRAY EVERY OTHER DAY AS NEEDED Disp:  Rfl:       Past Medical Hi GENERAL: well developed, well nourished,in no apparent distress  SKIN: no rashes,no suspicious lesions  HEAD: atraumatic, normocephalic.  no tenderness on palpation of  sinuses  EYES: conjunctiva clear, EOM intact  EARS: TM's gray, no bulging, no retractio · If symptoms are severe, rest at home for the first 2 to 3 days. When you resume activity, don't let yourself get too tired. · Avoid being exposed to cigarette smoke (yours or others’).   · You may use acetaminophen or ibuprofen to control pain and fever, © 4219-6848 [employer] [address]. [address] [facility] [place], [address]. All rights reserved. This information is not intended as a substitute for professional medical care. Always follow your healthcare professional's instructions.             The

## 2019-02-01 ENCOUNTER — TELEPHONE (OUTPATIENT)
Dept: FAMILY MEDICINE CLINIC | Facility: CLINIC | Age: 69
End: 2019-02-01

## 2019-03-08 ENCOUNTER — TELEPHONE (OUTPATIENT)
Dept: FAMILY MEDICINE CLINIC | Facility: CLINIC | Age: 69
End: 2019-03-08

## 2019-04-15 ENCOUNTER — TELEPHONE (OUTPATIENT)
Dept: FAMILY MEDICINE CLINIC | Facility: CLINIC | Age: 69
End: 2019-04-15

## 2019-04-15 DIAGNOSIS — Z51.81 ENCOUNTER FOR THERAPEUTIC DRUG MONITORING: ICD-10-CM

## 2019-04-15 DIAGNOSIS — R73.03 PREDIABETES: ICD-10-CM

## 2019-04-15 DIAGNOSIS — E78.00 PURE HYPERCHOLESTEROLEMIA: Primary | ICD-10-CM

## 2019-04-15 DIAGNOSIS — E03.9 ACQUIRED HYPOTHYROIDISM: ICD-10-CM

## 2019-04-15 NOTE — TELEPHONE ENCOUNTER
Requested Medications      Name from pharmacy: [address]         Will file in chart as: LEVOTHYROXINE SODIUM 88 MCG Oral Tab    Sig: TAKE 1 TABLET BY MOUTH ONCE DAILY BEFORE BREAKFAST    Disp:  90 tablet    Refills:  1    Start: 4/15/2019

## 2019-04-16 RX ORDER — LEVOTHYROXINE SODIUM 88 UG/1
TABLET ORAL
Qty: 90 TABLET | Refills: 1 | Status: SHIPPED | OUTPATIENT
Start: 2019-04-16 | End: 2019-12-18

## 2019-04-16 RX ORDER — LEVOTHYROXINE SODIUM 88 UG/1
TABLET ORAL
Qty: 90 TABLET | Refills: 0 | Status: SHIPPED | OUTPATIENT
Start: 2019-04-16 | End: 2019-04-16

## 2019-04-16 NOTE — TELEPHONE ENCOUNTER
Labs pended for approval.  Patient will have labs drawn prior to upcoming appt  Future Appointments   Date Time Provider Nicholas Wilks   4/24/2019  1:00 PM Merissa Kumari,  [facility] [address] [facility] [address]

## 2019-04-24 ENCOUNTER — OFFICE VISIT (OUTPATIENT)
Dept: FAMILY MEDICINE CLINIC | Facility: CLINIC | Age: 69
End: 2019-04-24
Payer: MEDICARE

## 2019-04-24 VITALS
SYSTOLIC BLOOD PRESSURE: 130 MMHG | TEMPERATURE: 98 F | WEIGHT: 221 LBS | DIASTOLIC BLOOD PRESSURE: 82 MMHG | BODY MASS INDEX: 37.73 KG/M2 | HEIGHT: 64 IN | HEART RATE: 84 BPM | RESPIRATION RATE: 16 BRPM

## 2019-04-24 DIAGNOSIS — Z12.31 SCREENING MAMMOGRAM, ENCOUNTER FOR: ICD-10-CM

## 2019-04-24 DIAGNOSIS — K14.6 BURNING TONGUE SYNDROME: ICD-10-CM

## 2019-04-24 DIAGNOSIS — R06.83 SNORINGS: ICD-10-CM

## 2019-04-24 DIAGNOSIS — R40.0 DAYTIME SOMNOLENCE: Primary | ICD-10-CM

## 2019-04-24 PROCEDURE — 99213 OFFICE O/P EST LOW 20 MIN: CPT | Performed by: FAMILY MEDICINE

## 2019-04-24 RX ORDER — ACETAMINOPHEN / DIPHENHYDRAMINE 25; 500 MG/1; MG/1
2 TABLET ORAL NIGHTLY
COMMUNITY

## 2019-04-24 NOTE — PROGRESS NOTES
HPI:   Doug Mcmahon is a 70year old female that presents for medication management and to review lab results. Hx of hypothyroidism and notes more fatigue over past few months. Has not been taking vitamin d.   TFTs, CMP, CBC normal.  She takes her l Rfl: 1  •  Acetaminophen-Codeine #3 300-30 MG Oral Tab, , Disp: , Rfl: 0  •  ALPRAZolam 0.25 MG Oral Tab, Take 1 tablet (0.25 mg total) by mouth nightly as needed for Sleep or Anxiety. , Disp: 30 tablet, Rfl: 0  •  prednisoLONE acetate 1 % Ophthalmic Suspen apparent distress.   HEENT:     Head:  Normocephalic, atraumatic    Eyes: EOMI, PERRLA, no scleral icterus, conjunctivae clear, no eye discharge    Ears: External normal. TMs normal without erythema or effusion   Nose: patent, no nasal discharge    Throat:

## 2019-05-22 ENCOUNTER — OFFICE VISIT (OUTPATIENT)
Dept: SLEEP CENTER | Facility: HOSPITAL | Age: 69
End: 2019-05-22
Attending: FAMILY MEDICINE
Payer: MEDICARE

## 2019-05-22 DIAGNOSIS — R06.83 SNORINGS: ICD-10-CM

## 2019-05-22 PROCEDURE — 95806 SLEEP STUDY UNATT&RESP EFFT: CPT

## 2019-05-23 ENCOUNTER — HOSPITAL ENCOUNTER (OUTPATIENT)
Dept: MAMMOGRAPHY | Age: 69
Discharge: HOME | End: 2019-05-23
Attending: FAMILY MEDICINE

## 2019-05-23 ENCOUNTER — HOSPITAL ENCOUNTER (OUTPATIENT)
Dept: CT IMAGING | Age: 69
Discharge: HOME | End: 2019-05-23
Attending: FAMILY MEDICINE

## 2019-05-23 DIAGNOSIS — Z13.9 SCREENING FOR CONDITION: ICD-10-CM

## 2019-05-23 DIAGNOSIS — Z12.31 SCREENING MAMMOGRAM, ENCOUNTER FOR: ICD-10-CM

## 2019-05-23 PROBLEM — K76.0 FATTY LIVER: Status: ACTIVE | Noted: 2019-05-23

## 2019-05-23 PROCEDURE — 77063 BREAST TOMOSYNTHESIS BI: CPT | Performed by: FAMILY MEDICINE

## 2019-05-23 PROCEDURE — 77067 SCR MAMMO BI INCL CAD: CPT | Performed by: FAMILY MEDICINE

## 2019-05-24 RX ORDER — ALPRAZOLAM 0.25 MG/1
TABLET ORAL
Qty: 30 TABLET | Refills: 0 | Status: SHIPPED | OUTPATIENT
Start: 2019-05-24 | End: 2019-10-10

## 2019-05-24 NOTE — TELEPHONE ENCOUNTER
Marquez Santana per Dr. Tse All approval to [facility] [phone] with confirmation received.

## 2019-05-24 NOTE — TELEPHONE ENCOUNTER
Medication(s) to Refill:   Requested Prescriptions     Pending Prescriptions Disp Refills   • ALPRAZOLAM 0.25 MG Oral Tab [Pharmacy Med Name: ALPRAZolam 0.25MG   TAB] 30 tablet 0     Sig: TAKE 1 TABLET BY MOUTH NIGHTLY AS NEEDED FOR SLEEP OR ANXIETY

## 2019-05-27 NOTE — PROCEDURES
[address]       Accredited by the [facility] ([facility])    PATIENT'S NAME:        Jenny Fournier  ATTENDING PHYSICIAN:   Mitchel Wheat M.D. REFERRING PHYSICIAN:   Merissa Kumari DO jeremy of 86%. The patient spent about 2 minutes, which was 1% of recording time with oxygen saturation levels of 88% or below. Snoring was noted. Heart rate averaged 66.     IMPRESSION:  This home sleep test documents mild obstructive sleep apnea, which

## 2019-05-29 ENCOUNTER — TELEPHONE (OUTPATIENT)
Dept: FAMILY MEDICINE CLINIC | Facility: CLINIC | Age: 69
End: 2019-05-29

## 2019-06-25 ENCOUNTER — TELEPHONE (OUTPATIENT)
Dept: FAMILY MEDICINE CLINIC | Facility: CLINIC | Age: 69
End: 2019-06-25

## 2019-06-25 NOTE — TELEPHONE ENCOUNTER
Pt  walked in at approx 4:05 pm to make an appt to see Dr. Alma Rosa Florence for intestinal pain. She was somewhat rude and agitated. Dr. Alma Rosa Florence does not have availability until 7/8/2018.   When I told her the next available date she became even more agitated and said

## 2019-06-27 ENCOUNTER — TELEPHONE (OUTPATIENT)
Dept: FAMILY MEDICINE CLINIC | Facility: CLINIC | Age: 69
End: 2019-06-27

## 2019-10-10 RX ORDER — ALPRAZOLAM 0.25 MG/1
TABLET ORAL
Qty: 30 TABLET | Refills: 0 | Status: SHIPPED | OUTPATIENT
Start: 2019-10-10 | End: 2020-03-18

## 2019-10-10 NOTE — TELEPHONE ENCOUNTER
Requesting Alprazolam 0.25mg  LOV: 5/24/19  RTC: 6 months  Last Relevant Labs: annual labs 4/18/19  Filled: 5/24/19 #30 with 0 refills    No future appointments.     Rx pended for approval/denial

## 2019-10-14 ENCOUNTER — PATIENT OUTREACH (OUTPATIENT)
Dept: FAMILY MEDICINE CLINIC | Facility: CLINIC | Age: 69
End: 2019-10-14

## 2019-10-24 ENCOUNTER — TELEPHONE (OUTPATIENT)
Dept: FAMILY MEDICINE CLINIC | Facility: CLINIC | Age: 69
End: 2019-10-24

## 2019-10-24 NOTE — TELEPHONE ENCOUNTER
Patient is due for her annual wellness visit. Spoke with patient she states she does not need to come in, doesn't need a physical, she states she feels fine, her meds are up to date, her mammogram are up to date. She declined to schedule AWV.

## 2019-12-18 RX ORDER — LEVOTHYROXINE SODIUM 88 UG/1
TABLET ORAL
Qty: 90 TABLET | Refills: 0 | Status: SHIPPED | OUTPATIENT
Start: 2019-12-18 | End: 2020-03-18

## 2019-12-18 NOTE — TELEPHONE ENCOUNTER
Requested Prescriptions      Name from pharmacy: [facility] [address]         Will file in chart as: EUTHYROX 88 MCG Oral Tab         Sig: TAKE 1 TABLET BY MOUTH ONCE DAILY BEFORE BREAKFAST    Disp:  90 tablet (Pharmacy requested: 90 each)    Refill

## 2020-03-19 RX ORDER — ALPRAZOLAM 0.25 MG/1
0.25 TABLET ORAL NIGHTLY PRN
Qty: 30 TABLET | Refills: 0 | Status: SHIPPED | OUTPATIENT
Start: 2020-03-19 | End: 2020-08-19

## 2020-03-19 RX ORDER — ALPRAZOLAM 0.25 MG/1
TABLET ORAL
Qty: 30 TABLET | Refills: 0 | OUTPATIENT
Start: 2020-03-19

## 2020-03-19 RX ORDER — LEVOTHYROXINE SODIUM 88 UG/1
88 TABLET ORAL
Qty: 90 TABLET | Refills: 0 | Status: SHIPPED | OUTPATIENT
Start: 2020-03-19 | End: 2020-07-14

## 2020-03-19 RX ORDER — LEVOTHYROXINE SODIUM 88 UG/1
TABLET ORAL
Qty: 90 TABLET | Refills: 0 | OUTPATIENT
Start: 2020-03-19

## 2020-03-20 RX ORDER — ALPRAZOLAM 0.25 MG/1
TABLET ORAL
Qty: 30 TABLET | Refills: 0 | OUTPATIENT
Start: 2020-03-20

## 2020-03-20 RX ORDER — LEVOTHYROXINE SODIUM 88 UG/1
TABLET ORAL
Qty: 90 TABLET | Refills: 0 | OUTPATIENT
Start: 2020-03-20

## 2020-07-14 DIAGNOSIS — E78.00 PURE HYPERCHOLESTEROLEMIA: Primary | ICD-10-CM

## 2020-07-14 DIAGNOSIS — E03.1 CONGENITAL HYPOTHYROIDISM WITHOUT GOITER: ICD-10-CM

## 2020-07-14 RX ORDER — LEVOTHYROXINE SODIUM 88 UG/1
TABLET ORAL
Qty: 30 TABLET | Refills: 0 | Status: SHIPPED | OUTPATIENT
Start: 2020-07-14 | End: 2020-08-19

## 2020-07-14 NOTE — TELEPHONE ENCOUNTER
Labs orders sent to [facility].  Thyroid meds refilled for 30 days. Please get labs done 1-2 days before appt. Call to schedule.     Shreyas Maxwell, DO  Family Medicine

## 2020-07-14 NOTE — TELEPHONE ENCOUNTER
Requested Prescriptions      Name from pharmacy: [facility] [address]         Will file in chart as: EUTHYROX 88 MCG Oral Tab         Sig: TAKE 1 TABLET BY MOUTH ONCE DAILY BEFORE BREAKFAST    Disp:  90 tablet    Refills:  0    Start: 7/14/2020    C

## 2020-07-29 LAB
ABSOLUTE BASOPHILS: 92 CELLS/UL (ref 0–200)
ABSOLUTE EOSINOPHILS: 121 CELLS/UL (ref 15–500)
ABSOLUTE LYMPHOCYTES: 2229 CELLS/UL (ref 850–3900)
ABSOLUTE MONOCYTES: 596 CELLS/UL (ref 200–950)
ABSOLUTE NEUTROPHILS: 4061 CELLS/UL (ref 1500–7800)
ALBUMIN/GLOBULIN RATIO: 1.3 (CALC) (ref 1–2.5)
ALBUMIN: 4 G/DL (ref 3.6–5.1)
ALKALINE PHOSPHATASE: 78 U/L (ref 37–153)
ALT: 4 U/L (ref 6–29)
AST: 14 U/L (ref 10–35)
BASOPHILS: 1.3 %
BILIRUBIN, TOTAL: 0.6 MG/DL (ref 0.2–1.2)
BUN: 11 MG/DL (ref 7–25)
CALCIUM: 9.3 MG/DL (ref 8.6–10.4)
CARBON DIOXIDE: 26 MMOL/L (ref 20–32)
CHLORIDE: 105 MMOL/L (ref 98–110)
CHOL/HDLC RATIO: 4.1 (CALC)
CHOLESTEROL, TOTAL: 219 MG/DL
CREATININE: 0.81 MG/DL (ref 0.6–0.93)
EGFR IF AFRICN AM: 85 ML/MIN/1.73M2
EGFR IF NONAFRICN AM: 73 ML/MIN/1.73M2
EOSINOPHILS: 1.7 %
GLOBULIN: 3 G/DL (CALC) (ref 1.9–3.7)
GLUCOSE: 102 MG/DL (ref 65–99)
HDL CHOLESTEROL: 54 MG/DL
HEMATOCRIT: 44 % (ref 35–45)
HEMOGLOBIN: 14.2 G/DL (ref 11.7–15.5)
LDL-CHOLESTEROL: 147 MG/DL (CALC)
LYMPHOCYTES: 31.4 %
MCH: 28 PG (ref 27–33)
MCHC: 32.3 G/DL (ref 32–36)
MCV: 86.8 FL (ref 80–100)
MONOCYTES: 8.4 %
MPV: 10.6 FL (ref 7.5–12.5)
NEUTROPHILS: 57.2 %
NON-HDL CHOLESTEROL: 165 MG/DL (CALC)
PLATELET COUNT: 315 THOUSAND/UL (ref 140–400)
POTASSIUM: 4.3 MMOL/L (ref 3.5–5.3)
PROTEIN, TOTAL: 7 G/DL (ref 6.1–8.1)
RDW: 13.8 % (ref 11–15)
RED BLOOD CELL COUNT: 5.07 MILLION/UL (ref 3.8–5.1)
SODIUM: 138 MMOL/L (ref 135–146)
TRIGLYCERIDES: 79 MG/DL
TSH W/REFLEX TO FT4: 3.65 MIU/L (ref 0.4–4.5)
WHITE BLOOD CELL COUNT: 7.1 THOUSAND/UL (ref 3.8–10.8)

## 2020-08-19 ENCOUNTER — OFFICE VISIT (OUTPATIENT)
Dept: FAMILY MEDICINE CLINIC | Facility: CLINIC | Age: 70
End: 2020-08-19
Payer: MEDICARE

## 2020-08-19 VITALS
BODY MASS INDEX: 37.6 KG/M2 | SYSTOLIC BLOOD PRESSURE: 118 MMHG | RESPIRATION RATE: 16 BRPM | HEART RATE: 97 BPM | TEMPERATURE: 98 F | HEIGHT: 64 IN | DIASTOLIC BLOOD PRESSURE: 70 MMHG | WEIGHT: 220.25 LBS

## 2020-08-19 DIAGNOSIS — Z12.31 BREAST CANCER SCREENING BY MAMMOGRAM: ICD-10-CM

## 2020-08-19 DIAGNOSIS — Z00.00 ENCOUNTER FOR ANNUAL HEALTH EXAMINATION: Primary | ICD-10-CM

## 2020-08-19 DIAGNOSIS — G47.00 INSOMNIA, UNSPECIFIED TYPE: ICD-10-CM

## 2020-08-19 DIAGNOSIS — E78.00 PURE HYPERCHOLESTEROLEMIA: ICD-10-CM

## 2020-08-19 DIAGNOSIS — E03.1 CONGENITAL HYPOTHYROIDISM WITHOUT GOITER: ICD-10-CM

## 2020-08-19 PROBLEM — M85.80 OSTEOPENIA: Status: ACTIVE | Noted: 2020-01-30

## 2020-08-19 PROBLEM — M79.7 FIBROMYALGIA: Status: ACTIVE | Noted: 2020-01-30

## 2020-08-19 PROCEDURE — G0439 PPPS, SUBSEQ VISIT: HCPCS | Performed by: FAMILY MEDICINE

## 2020-08-19 PROCEDURE — 99213 OFFICE O/P EST LOW 20 MIN: CPT | Performed by: FAMILY MEDICINE

## 2020-08-19 RX ORDER — ALPRAZOLAM 0.25 MG/1
0.25 TABLET ORAL NIGHTLY PRN
Qty: 30 TABLET | Refills: 1 | Status: SHIPPED | OUTPATIENT
Start: 2020-08-19 | End: 2021-06-03

## 2020-08-19 RX ORDER — LEVOTHYROXINE SODIUM 88 UG/1
88 TABLET ORAL
Qty: 90 TABLET | Refills: 3 | Status: SHIPPED | OUTPATIENT
Start: 2020-08-19 | End: 2021-05-21

## 2020-08-19 NOTE — PROGRESS NOTES
HPI:   Luiza Holliday is a 69year old female who presents for a Medicare Subsequent Annual Wellness visit (Pt already had Initial Annual Wellness). Due for mammogram.  Diet varied, walks for exercise.   Sees Dr. Lisa Mederos for Rheum ordered DEXA for he screening   Tam Cid was screened for Alcohol abuse and had a score of 0 so is at low risk.     Patient Care Team: Patient Care Team:  Akosua Massey DO as PCP - General  Litzy Citizen, APRN as PCP - Maria De Jesus Mancuso MD (Cardiac Electrophysio Take 1 tablet (0.25 mg total) by mouth nightly as needed.   fluconazole 100 MG Oral Tab, Take 2 tabs PO  first day, then one tab PO daily for nine days  diphenhydrAMINE-APAP, sleep, (TYLENOL PM EXTRA STRENGTH)  MG Oral Tab, Take 2 tablets by mouth nig Breast Cancer (age of onset: 60) in her self; Cancer in her father, mother, and sister; sjogrens in her sister. SOCIAL HISTORY:   She  reports that she has been smoking. She has a 26.25 pack-year smoking history.  She has never used smokeless tobacco. She tenderness/mass/nodules; no carotid bruit or JVD   Back:   Symmetric, no curvature, ROM normal, no CVA tenderness   Lungs:   Clear to auscultation bilaterally, respirations unlabored   Heart:  Regular rate and rhythm, S1 and S2 normal, no murmur, rub, or g Levothyroxine Sodium (EUTHYROX) 88 MCG Oral Tab; Take 1 tablet (88 mcg total) by mouth every morning before breakfast.    Insomnia, unspecified type  -     ALPRAZolam 0.25 MG Oral Tab; Take 1 tablet (0.25 mg total) by mouth nightly as needed.          Die Occult Blood Annually No results found for: FOB No flowsheet data found. Glaucoma Screening      Ophthalmology Visit Annually: Diabetics, FHx Glaucoma, AA>50, > 65 No flowsheet data found.     Bone Density Screening      Dexascan Every two years

## 2020-08-19 NOTE — PATIENT INSTRUCTIONS
Katerina Carnett's SCREENING SCHEDULE   Tests on this list are recommended by your physician but may not be covered, or covered at this frequency, by your insurer. Please check with your insurance carrier before scheduling to verify coverage.    TODD Colorectal Cancer Screening  Covered up to Age 76     Colonoscopy Screen   Covered every 10 years- more often if abnormal Colonoscopy due on 05/08/2023 Update Health Maintenance if applicable    Flex Sigmoidoscopy Screen  Covered every 5 years No results LISA 3 YRS+   Orders placed or performed in visit on 10/17/13   • INFLUENZA VIRUS VACCINE, PRESERV FREE, >=1YEARS OF AGE    Please get every year    Pneumococcal 13 (Prevnar)  Covered Once after 65 Orders placed or performed in visit on 08/16/16   • PNEUMO Association regarding Advance Directives.

## 2021-02-23 ENCOUNTER — PATIENT MESSAGE (OUTPATIENT)
Dept: FAMILY MEDICINE CLINIC | Facility: CLINIC | Age: 71
End: 2021-02-23

## 2021-02-24 NOTE — TELEPHONE ENCOUNTER
From: Lacy Cedillo  To: Tori Morales DO  Sent: 2/23/2021 7:46 PM CST  Subject: Non-Urgent Medical Question    When can I get a Covid vaccine through [facility]? ??

## 2021-03-03 DIAGNOSIS — Z23 NEED FOR VACCINATION: ICD-10-CM

## 2021-03-06 ENCOUNTER — IMMUNIZATION (OUTPATIENT)
Dept: LAB | Age: 71
End: 2021-03-06
Attending: HOSPITALIST
Payer: MEDICARE

## 2021-03-06 DIAGNOSIS — Z23 NEED FOR VACCINATION: Primary | ICD-10-CM

## 2021-03-06 PROCEDURE — 0001A SARSCOV2 VAC 30MCG/0.3ML IM: CPT

## 2021-03-27 ENCOUNTER — IMMUNIZATION (OUTPATIENT)
Dept: LAB | Age: 71
End: 2021-03-27
Attending: HOSPITALIST
Payer: MEDICARE

## 2021-03-27 DIAGNOSIS — Z23 NEED FOR VACCINATION: Primary | ICD-10-CM

## 2021-03-27 PROCEDURE — 0002A SARSCOV2 VAC 30MCG/0.3ML IM: CPT

## 2021-11-21 DIAGNOSIS — E03.1 CONGENITAL HYPOTHYROIDISM WITHOUT GOITER: ICD-10-CM

## 2021-11-22 RX ORDER — LEVOTHYROXINE SODIUM 88 UG/1
TABLET ORAL
Qty: 90 TABLET | Refills: 0 | OUTPATIENT
Start: 2021-11-22

## 2023-05-08 ENCOUNTER — ANESTHESIA EVENT (OUTPATIENT)
Dept: SURGERY | Facility: HOSPITAL | Age: 73
End: 2023-05-08
Payer: MEDICARE

## 2023-05-08 ENCOUNTER — HOSPITAL ENCOUNTER (OUTPATIENT)
Facility: HOSPITAL | Age: 73
Setting detail: HOSPITAL OUTPATIENT SURGERY
Discharge: HOME | End: 2023-05-08
Attending: SURGERY | Admitting: SURGERY
Payer: MEDICARE

## 2023-05-08 ENCOUNTER — ANESTHESIA (OUTPATIENT)
Dept: SURGERY | Facility: HOSPITAL | Age: 73
End: 2023-05-08
Payer: MEDICARE

## 2023-05-08 VITALS
TEMPERATURE: 99 F | SYSTOLIC BLOOD PRESSURE: 125 MMHG | BODY MASS INDEX: 36.43 KG/M2 | HEIGHT: 64 IN | DIASTOLIC BLOOD PRESSURE: 75 MMHG | RESPIRATION RATE: 18 BRPM | WEIGHT: 213.38 LBS | HEART RATE: 63 BPM | OXYGEN SATURATION: 97 %

## 2023-05-08 PROCEDURE — 0FT44ZZ RESECTION OF GALLBLADDER, PERCUTANEOUS ENDOSCOPIC APPROACH: ICD-10-PCS | Performed by: SURGERY

## 2023-05-08 PROCEDURE — 88304 TISSUE EXAM BY PATHOLOGIST: CPT | Performed by: SURGERY

## 2023-05-08 RX ORDER — HYDROMORPHONE HYDROCHLORIDE 1 MG/ML
0.6 INJECTION, SOLUTION INTRAMUSCULAR; INTRAVENOUS; SUBCUTANEOUS EVERY 5 MIN PRN
Status: DISCONTINUED | OUTPATIENT
Start: 2023-05-08 | End: 2023-05-08

## 2023-05-08 RX ORDER — NEOSTIGMINE METHYLSULFATE 1 MG/ML
INJECTION, SOLUTION INTRAVENOUS AS NEEDED
Status: DISCONTINUED | OUTPATIENT
Start: 2023-05-08 | End: 2023-05-08 | Stop reason: SURG

## 2023-05-08 RX ORDER — ROCURONIUM BROMIDE 10 MG/ML
INJECTION, SOLUTION INTRAVENOUS AS NEEDED
Status: DISCONTINUED | OUTPATIENT
Start: 2023-05-08 | End: 2023-05-08 | Stop reason: SURG

## 2023-05-08 RX ORDER — KETOROLAC TROMETHAMINE 30 MG/ML
INJECTION, SOLUTION INTRAMUSCULAR; INTRAVENOUS AS NEEDED
Status: DISCONTINUED | OUTPATIENT
Start: 2023-05-08 | End: 2023-05-08 | Stop reason: SURG

## 2023-05-08 RX ORDER — ACETAMINOPHEN AND CODEINE PHOSPHATE 300; 30 MG/1; MG/1
1 TABLET ORAL ONCE
Status: COMPLETED | OUTPATIENT
Start: 2023-05-08 | End: 2023-05-08

## 2023-05-08 RX ORDER — PHENYLEPHRINE HCL 10 MG/ML
VIAL (ML) INJECTION AS NEEDED
Status: DISCONTINUED | OUTPATIENT
Start: 2023-05-08 | End: 2023-05-08 | Stop reason: SURG

## 2023-05-08 RX ORDER — ACETAMINOPHEN 10 MG/ML
1000 INJECTION, SOLUTION INTRAVENOUS ONCE AS NEEDED
Status: DISCONTINUED | OUTPATIENT
Start: 2023-05-08 | End: 2023-05-08

## 2023-05-08 RX ORDER — GLYCOPYRROLATE 0.2 MG/ML
INJECTION, SOLUTION INTRAMUSCULAR; INTRAVENOUS AS NEEDED
Status: DISCONTINUED | OUTPATIENT
Start: 2023-05-08 | End: 2023-05-08 | Stop reason: SURG

## 2023-05-08 RX ORDER — ONDANSETRON 2 MG/ML
4 INJECTION INTRAMUSCULAR; INTRAVENOUS EVERY 6 HOURS PRN
Status: DISCONTINUED | OUTPATIENT
Start: 2023-05-08 | End: 2023-05-08

## 2023-05-08 RX ORDER — LIDOCAINE HYDROCHLORIDE AND EPINEPHRINE 10; 10 MG/ML; UG/ML
INJECTION, SOLUTION INFILTRATION; PERINEURAL AS NEEDED
Status: DISCONTINUED | OUTPATIENT
Start: 2023-05-08 | End: 2023-05-08 | Stop reason: HOSPADM

## 2023-05-08 RX ORDER — DIPHENHYDRAMINE HYDROCHLORIDE 50 MG/ML
12.5 INJECTION INTRAMUSCULAR; INTRAVENOUS AS NEEDED
Status: DISCONTINUED | OUTPATIENT
Start: 2023-05-08 | End: 2023-05-08

## 2023-05-08 RX ORDER — MEPERIDINE HYDROCHLORIDE 25 MG/ML
12.5 INJECTION INTRAMUSCULAR; INTRAVENOUS; SUBCUTANEOUS AS NEEDED
Status: DISCONTINUED | OUTPATIENT
Start: 2023-05-08 | End: 2023-05-08

## 2023-05-08 RX ORDER — ACETAMINOPHEN 500 MG
1000 TABLET ORAL ONCE
Status: DISCONTINUED | OUTPATIENT
Start: 2023-05-08 | End: 2023-05-08 | Stop reason: HOSPADM

## 2023-05-08 RX ORDER — MIDAZOLAM HYDROCHLORIDE 1 MG/ML
1 INJECTION INTRAMUSCULAR; INTRAVENOUS EVERY 5 MIN PRN
Status: DISCONTINUED | OUTPATIENT
Start: 2023-05-08 | End: 2023-05-08

## 2023-05-08 RX ORDER — METOCLOPRAMIDE HYDROCHLORIDE 5 MG/ML
10 INJECTION INTRAMUSCULAR; INTRAVENOUS EVERY 8 HOURS PRN
Status: DISCONTINUED | OUTPATIENT
Start: 2023-05-08 | End: 2023-05-08

## 2023-05-08 RX ORDER — HYDRALAZINE HYDROCHLORIDE 20 MG/ML
INJECTION INTRAMUSCULAR; INTRAVENOUS AS NEEDED
Status: DISCONTINUED | OUTPATIENT
Start: 2023-05-08 | End: 2023-05-08 | Stop reason: SURG

## 2023-05-08 RX ORDER — NALOXONE HYDROCHLORIDE 0.4 MG/ML
80 INJECTION, SOLUTION INTRAMUSCULAR; INTRAVENOUS; SUBCUTANEOUS AS NEEDED
Status: DISCONTINUED | OUTPATIENT
Start: 2023-05-08 | End: 2023-05-08

## 2023-05-08 RX ORDER — HYDROCODONE BITARTRATE AND ACETAMINOPHEN 5; 325 MG/1; MG/1
1 TABLET ORAL EVERY 4 HOURS PRN
Qty: 20 TABLET | Refills: 0 | Status: SHIPPED | OUTPATIENT
Start: 2023-05-08

## 2023-05-08 RX ORDER — SODIUM CHLORIDE, SODIUM LACTATE, POTASSIUM CHLORIDE, CALCIUM CHLORIDE 600; 310; 30; 20 MG/100ML; MG/100ML; MG/100ML; MG/100ML
INJECTION, SOLUTION INTRAVENOUS CONTINUOUS
Status: DISCONTINUED | OUTPATIENT
Start: 2023-05-08 | End: 2023-05-08

## 2023-05-08 RX ORDER — ACETAMINOPHEN AND CODEINE PHOSPHATE 300; 30 MG/1; MG/1
TABLET ORAL
Status: DISCONTINUED
Start: 2023-05-08 | End: 2023-05-08

## 2023-05-08 RX ORDER — BUPIVACAINE HYDROCHLORIDE 5 MG/ML
INJECTION, SOLUTION EPIDURAL; INTRACAUDAL AS NEEDED
Status: DISCONTINUED | OUTPATIENT
Start: 2023-05-08 | End: 2023-05-08 | Stop reason: HOSPADM

## 2023-05-08 RX ORDER — HYDROMORPHONE HYDROCHLORIDE 1 MG/ML
0.2 INJECTION, SOLUTION INTRAMUSCULAR; INTRAVENOUS; SUBCUTANEOUS EVERY 5 MIN PRN
Status: DISCONTINUED | OUTPATIENT
Start: 2023-05-08 | End: 2023-05-08

## 2023-05-08 RX ORDER — LIDOCAINE HYDROCHLORIDE 10 MG/ML
INJECTION, SOLUTION EPIDURAL; INFILTRATION; INTRACAUDAL; PERINEURAL AS NEEDED
Status: DISCONTINUED | OUTPATIENT
Start: 2023-05-08 | End: 2023-05-08 | Stop reason: SURG

## 2023-05-08 RX ORDER — ONDANSETRON 2 MG/ML
INJECTION INTRAMUSCULAR; INTRAVENOUS AS NEEDED
Status: DISCONTINUED | OUTPATIENT
Start: 2023-05-08 | End: 2023-05-08 | Stop reason: SURG

## 2023-05-08 RX ORDER — HYDROMORPHONE HYDROCHLORIDE 1 MG/ML
0.4 INJECTION, SOLUTION INTRAMUSCULAR; INTRAVENOUS; SUBCUTANEOUS EVERY 5 MIN PRN
Status: DISCONTINUED | OUTPATIENT
Start: 2023-05-08 | End: 2023-05-08

## 2023-05-08 RX ADMIN — KETOROLAC TROMETHAMINE 15 MG: 30 INJECTION, SOLUTION INTRAMUSCULAR; INTRAVENOUS at 08:46:00

## 2023-05-08 RX ADMIN — NEOSTIGMINE METHYLSULFATE 3 MG: 1 INJECTION, SOLUTION INTRAVENOUS at 08:25:00

## 2023-05-08 RX ADMIN — ONDANSETRON 4 MG: 2 INJECTION INTRAMUSCULAR; INTRAVENOUS at 07:42:00

## 2023-05-08 RX ADMIN — SODIUM CHLORIDE, SODIUM LACTATE, POTASSIUM CHLORIDE, CALCIUM CHLORIDE: 600; 310; 30; 20 INJECTION, SOLUTION INTRAVENOUS at 08:22:00

## 2023-05-08 RX ADMIN — ROCURONIUM BROMIDE 40 MG: 10 INJECTION, SOLUTION INTRAVENOUS at 07:42:00

## 2023-05-08 RX ADMIN — PHENYLEPHRINE HCL 100 MCG: 10 MG/ML VIAL (ML) INJECTION at 08:03:00

## 2023-05-08 RX ADMIN — LIDOCAINE HYDROCHLORIDE 80 MG: 10 INJECTION, SOLUTION EPIDURAL; INFILTRATION; INTRACAUDAL; PERINEURAL at 07:42:00

## 2023-05-08 RX ADMIN — HYDRALAZINE HYDROCHLORIDE 20 MG: 20 INJECTION INTRAMUSCULAR; INTRAVENOUS at 08:46:00

## 2023-05-08 RX ADMIN — GLYCOPYRROLATE 0.6 MG: 0.2 INJECTION, SOLUTION INTRAMUSCULAR; INTRAVENOUS at 08:25:00

## 2023-05-08 RX ADMIN — SODIUM CHLORIDE, SODIUM LACTATE, POTASSIUM CHLORIDE, CALCIUM CHLORIDE: 600; 310; 30; 20 INJECTION, SOLUTION INTRAVENOUS at 07:34:00

## 2023-05-08 NOTE — ANESTHESIA PROCEDURE NOTES
Airway  Date/Time: 5/8/2023 7:44 AM  Urgency: elective      General Information and Staff    Patient location during procedure: OR  Anesthesiologist: Quique Calvin MD  Performed: anesthesiologist   Performed by: Quique Calvin MD  Authorized by: Quique Calvin MD      Indications and Patient Condition  Indications for airway management: anesthesia  Sedation level: deep  Preoxygenated: yes  Patient position: sniffing  Mask difficulty assessment: 1 - vent by mask    Final Airway Details  Final airway type: endotracheal airway      Successful airway: ETT  Cuffed: yes   Successful intubation technique: direct laryngoscopy  Endotracheal tube insertion site: oral  Blade: Garrett  Blade size: #2  ETT size (mm): 7.0    Cormack-Lehane Classification: grade IIA - partial view of glottis  Placement verified by: chest auscultation and capnometry   Measured from: lips  ETT to lips (cm): 21  Number of attempts at approach: 1

## 2023-05-08 NOTE — OPERATIVE REPORT
OPERATIVE REPORT   PREOPERATIVE DIAGNOSIS:  CHOLECYSTITIS     POSTOPERATIVE DIAGNOSIS: CHOLECYSTITIS    PROCEDURE PERFORMED: Laparoscopic cholecystectomy. ASSISTANT:  Kezia Kelly  Her assistance was necessary for camera holding, trocar insertion and retracting     INTRAOPERATIVE FINDINGS: Cholelithiasis, cholecystitis. DESCRIPTION OF PROCEDURE: Patient was brought into the operating room and placed on the operating table in the supine position. Inhalational anesthesia was provided by the attending anesthesiologist. The abdomen was prepped and draped in the usual sterile fashion. Lidocaine 1% and 0.5% Marcaine were used as a local anesthetic. I made a skin incision under the umbilicus. The abdominal wall fascia was grasped between 2 Kochers and opened under direct vision. A blunt-tip Liang trocar was introduced in the abdomen. A pneumoperitoneum was established. The laparoscope was inserted. Three trocars were introduced in the right upper quadrant, all under direct laparoscopic guidance. My assistant grasped the gallbladder, retracted it over the edge of the liver, while I dissected adhesions off the gallbladder wall. The cystic artery was identified. It was dissected free, ligated with hemoclips, and divided. The cyst duct was identified. It was ligated with hemoclips and divided. The gallbladder was released from the undersurface of the liver using electrocautery. The gallbladder was placed into a plastic pouch and retrieved through the umbilical port. The right upper quadrant was irrigated with sterile saline. There was no evidence of any ongoing bleeding or any biliary drainage. At this time, the procedure was complete. The pneumoperitoneum was released. The trocars were removed. The wounds were closed in layers using absorbable sutures. Steri-Strips and a sterile dressing were provided. Patient tolerated the procedure well. Patient was taken to the recovery room for observation.    Dictated By Mike Hidalgo [facility] Wan LINN M.D.

## 2023-05-08 NOTE — INTERVAL H&P NOTE
Pre-op Diagnosis: CHOLECYSTITIS    The above referenced H&P was reviewed by Chaya García MD on 5/8/2023, the patient was examined and no significant changes have occurred in the patient's condition since the H&P was performed. I discussed with the patient and/or legal representative the potential benefits, risks and side effects of this procedure; the likelihood of the patient achieving goals; and potential problems that might occur during recuperation. I discussed reasonable alternatives to the procedure, including risks, benefits and side effects related to the alternatives and risks related to not receiving this procedure. We will proceed with procedure as planned.

## (undated) DEVICE — TROCARS: Brand: KII® BALLOON BLUNT TIP SYSTEM

## (undated) DEVICE — HUNTER GASPER TIP, DISPOSABLE: Brand: RENEW

## (undated) DEVICE — L-HOOK CAUTERY PROBE TIP, DISPOSABLE: Brand: RENEW

## (undated) DEVICE — LAP CHOLE/APPY CDS-LF: Brand: MEDLINE INDUSTRIES, INC.

## (undated) DEVICE — 40580 - THE PINK PAD - ADVANCED TRENDELENBURG POSITIONING KIT: Brand: 40580 - THE PINK PAD - ADVANCED TRENDELENBURG POSITIONING KIT

## (undated) DEVICE — UNDYED BRAIDED (POLYGLACTIN 910), SYNTHETIC ABSORBABLE SUTURE: Brand: COATED VICRYL

## (undated) DEVICE — TROCAR: Brand: KII FIOS FIRST ENTRY

## (undated) DEVICE — TRADITIONAL MARYLAND DISSECTOR TIP, DISPOSABLE: Brand: RENEW

## (undated) DEVICE — POUCH SPECIMEN WIRE 6X3 250ML

## (undated) DEVICE — ENDOCUT SCISSOR TIP, DISPOSABLE: Brand: RENEW

## (undated) DEVICE — GAUZE STERILE 4X4 12PLY

## (undated) DEVICE — VIOLET BRAIDED (POLYGLACTIN 910), SYNTHETIC ABSORBABLE SUTURE: Brand: COATED VICRYL

## (undated) DEVICE — 3M™ TEGADERM™ TRANSPARENT FILM DRESSING, 1626W, 4 IN X 4-3/4 IN (10 CM X 12 CM), 50 EACH/CARTON, 4 CARTON/CASE: Brand: 3M™ TEGADERM™

## (undated) DEVICE — LIGHT HANDLE

## (undated) DEVICE — DISPOSABLE LAPAROSCOPIC CLIP APPLIER WITH 20 CLIPS.: Brand: EPIX® UNIVERSAL CLIP APPLIER

## (undated) DEVICE — LAPCLINCH GRASPER TIP, DISPOSABLE: Brand: RENEW

## (undated) DEVICE — 3M™ TEGADERM™ TRANSPARENT FILM DRESSING FRAME STYLE, 1624W, US-VER, 100/CARTON 4 CARTONS/CASE: Brand: 3M™ TEGADERM™

## (undated) DEVICE — STERILE SYNTHETIC POLYISOPRENE POWDER-FREE SURGICAL GLOVES WITH HYDROGEL COATING, SMOOTH FINISH, STRAIGHT FINGER: Brand: PROTEXIS

## (undated) DEVICE — APPLICATOR CHLORAPREP 26ML

## (undated) DEVICE — SLEEVE KENDALL SCD EXPRESS MED
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
## (undated) NOTE — LETTER
OUTSIDE TESTING RESULT REQUEST     IMPORTANT: FOR YOUR IMMEDIATE ATTENTION  Please FAX all test results listed below to: [phone]     Testing already done on or about: needs    * * * * If testing is NOT complete, arrange with patient A.S.A.P. * * * *      Patient Name: Yolette Angel  Surgery Date: 2023    Medical Record: [id number]              CSN: [id number]    : 1950 - A: 67 y     Sex: female  Surgeon(s):  Brijesh Valladares MD  Procedure: LAPAROSCOPIC CHOLECYSTECTOMY  Anesthesia Type: General     Surgeon: Brijesh Valladares MD     The following Testing and Time Line are REQUIRED PER ANESTHESIA     Electrolytes with 14 days      Thank You,   Sent by:RUTH

## (undated) NOTE — MR AVS SNAPSHOT
[facility] [address]  [facility]  [phone]               Thank you for choosing us for your health care visit with Latha Saleh PA-C.   We are glad to serve you and happy to provide you If you are confident that your benefit plan will not require a referral or authorization, such as [place] Medicaid, please feel free to schedule your appointment immediately.  However, if you are unsure about the requirements for authorization, please wait •Walk in Clinic in [place] at [facility].  [address] Mon-Fri at 8am-7:30pm, and Sat/Sun 9am-430pm  Also at [address]  Call [phone] for info    • Please call our office about any questions regarding your treatment/medicines/tests as a re Cymbalta [Duloxetine Hcl]     headache    Norco [Apap-Fd&C Yellow #10 Al Hidalgo-Hydrocodone] Palpitations    Tramadol     Suicidal thoughts                Today's Vital Signs     BP Pulse Temp Height Weight BMI    124/80 mmHg 72 97.7 °F (36.5 °C) (Temporal) office, you can view your past visit information in [employer] by going to Visits < Visit Summaries. [employer] questions? Call [phone] for help. [employer] is NOT to be used for urgent needs. For medical emergencies, dial 911.         Educational Inform Tips for increasing your physical activity – Adults who are physically active are less likely to develop some chronic diseases than adults who are inactive.      HOW TO GET STARTED: HOW TO STAY MOTIVATED:   Start activities slowly and build up over time Do

## (undated) NOTE — MR AVS SNAPSHOT
[facility]  [address] [address] [phone]               Thank you for choosing us for your health care visit with Wendy Patino MD.  We are glad to serve you and happy to provide you with this velásquez authorize routine medications on weekends. ? No narcotics or controlled substances are refilled after noon on Fridays or by on call physicians. ? By law, narcotics cannot be faxed or phoned into your pharmacy.  The prescription must be signed by the provi the procedure/test has been pre-certified. You are strongly encouraged to contact your insurance carrier to verify that your procedure/test has been approved and is a COVERED benefit.   Although the [facility] staff does its due diligence, the insurance carrier g Melatonin 5 MG Tabs   Take 10 mg by mouth nightly. NON FORMULARY   genexa  For leg cramps           OLIVE LEAF OR   NASAL SPRAY EVERY OTHER DAY AS NEEDED           POTASSIUM GLUCONATE OR   Take 550 mg by mouth as needed.  Takes when has cramps in
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
